# Patient Record
Sex: FEMALE | Race: BLACK OR AFRICAN AMERICAN | Employment: UNEMPLOYED | URBAN - METROPOLITAN AREA
[De-identification: names, ages, dates, MRNs, and addresses within clinical notes are randomized per-mention and may not be internally consistent; named-entity substitution may affect disease eponyms.]

---

## 2021-03-23 ENCOUNTER — OFFICE VISIT (OUTPATIENT)
Dept: INTERNAL MEDICINE CLINIC | Age: 36
End: 2021-03-23
Payer: COMMERCIAL

## 2021-03-23 VITALS
WEIGHT: 285.6 LBS | HEIGHT: 66 IN | OXYGEN SATURATION: 99 % | HEART RATE: 98 BPM | SYSTOLIC BLOOD PRESSURE: 120 MMHG | BODY MASS INDEX: 45.9 KG/M2 | DIASTOLIC BLOOD PRESSURE: 80 MMHG | TEMPERATURE: 97.2 F

## 2021-03-23 DIAGNOSIS — I10 ESSENTIAL HYPERTENSION: ICD-10-CM

## 2021-03-23 DIAGNOSIS — E28.2 POLYCYSTIC OVARIAN SYNDROME: ICD-10-CM

## 2021-03-23 DIAGNOSIS — E11.9 TYPE 2 DIABETES MELLITUS WITHOUT COMPLICATION, WITHOUT LONG-TERM CURRENT USE OF INSULIN (HCC): Primary | ICD-10-CM

## 2021-03-23 DIAGNOSIS — E66.01 MORBID OBESITY (HCC): ICD-10-CM

## 2021-03-23 DIAGNOSIS — Z34.90 PREGNANCY, UNSPECIFIED GESTATIONAL AGE: ICD-10-CM

## 2021-03-23 DIAGNOSIS — Z13.9 SCREENING FOR CONDITION: ICD-10-CM

## 2021-03-23 DIAGNOSIS — D50.8 OTHER IRON DEFICIENCY ANEMIA: ICD-10-CM

## 2021-03-23 LAB
BILIRUBIN, POC: NORMAL
BLOOD URINE, POC: 1.02
CHP ED QC CHECK: NORMAL
CLARITY, POC: NORMAL
COLOR, POC: YELLOW
CONTROL: NORMAL
GLUCOSE BLD-MCNC: 115 MG/DL
GLUCOSE URINE, POC: NORMAL
HBA1C MFR BLD: 6.2 %
KETONES, POC: NORMAL
LEUKOCYTE EST, POC: NORMAL
NITRITE, POC: NORMAL
PH, POC: 8
PREGNANCY TEST URINE, POC: POSITIVE
PROTEIN, POC: NORMAL
SPECIFIC GRAVITY, POC: NORMAL
UROBILINOGEN, POC: 8

## 2021-03-23 PROCEDURE — 83036 HEMOGLOBIN GLYCOSYLATED A1C: CPT | Performed by: INTERNAL MEDICINE

## 2021-03-23 PROCEDURE — 99204 OFFICE O/P NEW MOD 45 MIN: CPT | Performed by: INTERNAL MEDICINE

## 2021-03-23 PROCEDURE — 81025 URINE PREGNANCY TEST: CPT | Performed by: INTERNAL MEDICINE

## 2021-03-23 PROCEDURE — 81002 URINALYSIS NONAUTO W/O SCOPE: CPT | Performed by: INTERNAL MEDICINE

## 2021-03-23 PROCEDURE — 82962 GLUCOSE BLOOD TEST: CPT | Performed by: INTERNAL MEDICINE

## 2021-03-23 RX ORDER — CHLORTHALIDONE 25 MG/1
25 TABLET ORAL DAILY
COMMUNITY
End: 2021-10-05

## 2021-03-23 SDOH — ECONOMIC STABILITY: TRANSPORTATION INSECURITY
IN THE PAST 12 MONTHS, HAS LACK OF TRANSPORTATION KEPT YOU FROM MEETINGS, WORK, OR FROM GETTING THINGS NEEDED FOR DAILY LIVING?: NO

## 2021-03-23 SDOH — ECONOMIC STABILITY: TRANSPORTATION INSECURITY
IN THE PAST 12 MONTHS, HAS THE LACK OF TRANSPORTATION KEPT YOU FROM MEDICAL APPOINTMENTS OR FROM GETTING MEDICATIONS?: NO

## 2021-03-23 SDOH — ECONOMIC STABILITY: FOOD INSECURITY: WITHIN THE PAST 12 MONTHS, THE FOOD YOU BOUGHT JUST DIDN'T LAST AND YOU DIDN'T HAVE MONEY TO GET MORE.: NEVER TRUE

## 2021-03-23 ASSESSMENT — PATIENT HEALTH QUESTIONNAIRE - PHQ9
SUM OF ALL RESPONSES TO PHQ QUESTIONS 1-9: 0
SUM OF ALL RESPONSES TO PHQ QUESTIONS 1-9: 0
SUM OF ALL RESPONSES TO PHQ9 QUESTIONS 1 & 2: 0

## 2021-03-23 NOTE — PROGRESS NOTES
SUBJECTIVE:  Malgorzata Fu is a 38 y.o. female [address] [place]   Chief Complaint   Patient presents with    New Patient    Hypertension    Diabetes      PT HERE TO INITIATE CARE     PREVIOUS PCP: DR BALLESTEROS - JEREL      DM - DIAGNOSED > 10 YRS. TAKING  MED.  ? EXERCISE / DIET  COMPLIANCE . HBS - HAS NOT BEEN CHECKING  . LAST EYE EXAM - ? 2020  HTN - ON MED. ELEVATED BP. ? DIET / EXERCISE COMPLIANCE, NO HEADACHE, NO DIZZINESS  POLYCYSTIC OVARY SYNDROME  - FOLLOWING WITH GYNE  IRON DEF ANEMIA - ON MED. OCC FATIGUE. NO COLD INTOLERANCE  MORBID OBESITY - DIET / EXERCISE REVIEWED. WT NOTED  SCREENING LAB D/W PT    DENIES CP, No SOB, No PALPITATIONS, No COUGH, NO F/C  No ABD PAIN, No N/V, No DIARRHEA, No CONSTIPATION, No MELENA, No HEMATOCHEZIA. No DYSURIA, No FREQ, No URGENCY, No HEMATURIA    PMH: REVIEWED AND UPDATED TODAY    PSH: REVIEWED AND UPDATED TODAY    SOCIAL HX: REVIEWED AND UPDATED TODAY    FAMILY HX: REVIEWED AND UPDATED TODAY    ALLERGY:  Patient has no known allergies. MEDS: REVIEWED  Prior to Visit Medications    Medication Sig Taking?  Authorizing Provider   metFORMIN (GLUCOPHAGE) 1000 MG tablet Take 1,000 mg by mouth 2 times daily (with meals) Yes Historical Provider, MD   chlorthalidone (HYGROTON) 25 MG tablet Take 25 mg by mouth daily Yes Historical Provider, MD   Polysaccharide Iron Complex (IRON UP PO) Take by mouth Yes Historical Provider, MD       OB/GYN: LMP 2/21                  LAST PAP SMEAR 12/20                  MAMMOGRAM ? 2019    IMMUNIZATION: INFLUENZA 9 / 20, TETANUS ? DATE    ROS: COMPREHENSIVE ROS AS IN HX, REST -VE  History obtained from chart review and the patient    OBJECTIVE:   NURSING NOTE AND VITALS REVIEWED  BP (!) 142/90 (Site: Left Upper Arm, Position: Sitting, Cuff Size: Large Adult)   Pulse 105   Temp 97.2 °F (36.2 °C)   Ht 5' 6.25\" (1.683 m)   Wt 285 lb 9.6 oz (129.5 kg)   SpO2 99%   Breastfeeding No   BMI 45.75 kg/m²     NO ACUTE DISTRESS    REPEAT BP:  120/80 (LT), NO ORTHOSTASIS     REPEAT PULSE: 98 - MANUAL    Body mass index is 45.75 kg/m². HEENT: MILD PALLOR, ANICTERIC, PERRLA, EOMI, NO CONJUNCTIVAL ERYTHEMA,                 NO SINUS TENDERNESS  NECK:  SUPPLE, TRACHEA MIDLINE, NT, NO JVD, NO CB, NO LA, NO TM, NO STIFFNESS  CHEST: RESPY EFFORT NL, GOOD AE, NO W/R/C  HEART: S1S2+ REG, NO M/G/R  ABD: OBESE, SOFT, NT, NO HSM, BS+  EXT: NO EDEMA, NT, PULSES +. LASHAY'S -VE  NEURO: ALERT AND ORIENTED X 3, NO MENINGEAL SIGNS, NO TREMORS, NL GAIT, NO FOCAL DEFICITS  PSYCH: FAIRLY GOOD AFFECT  BACK: NT, NO ROM, NO CVA TENDERNESS    PREVIOUS OUTSIDE LABS  REVIEWED AND D/W PT     ACCUCHECK: 115    POC HBA1C: 6.2    UA: NEGATIVE FOR UTI    PREG TEST : POSITIVE      ASSESSMENT / PLAN:     Diagnosis Orders   1. Type 2 diabetes mellitus without complication, without long-term current use of insulin ([facility] [address]  COUNSELLED. STABLE  READDRESS MED WITH OB  DIET/ EXERCISE. MONITOR. GOAL FBS 80- 120, HBA1C < 7  ADVISED ON HOME BLOOD SUGAR MONITORING  OBTAIN OPHTHAL REPORT  MAKE CHANGES AS NEEDED. 2. Essential hypertension  COUNSELLED. REPEAT BP AS ABOVE  READDRESS MED WITH OB  LOW NA+ / DASH DIET/ EXERCISE. MONITOR. GOAL </= 120/80  MAKE CHANGES AS NEEDED. 3. Polycystic ovarian syndrome  COUNSELLED. F/U OB/GYN. MONITOR  MAKE CHANGES AS NEEDED. 4. Other iron deficiency anemia  COUNSELLED. DEFERRED LAB TO PB. MONITOR  MAKE CHANGES AS NEEDED. 5. Morbid obesity ([address]  COUNSELLED. DIET/ EXERCISE.  LIFESTYLE MODIFICATION. MONITOR AND MAKE CHANGES AS NEEDED. 6. Screening for condition  COUNSELLED. LABS D/W PT. SEE BELOW       7. Pregnancy, unspecified gestational age  COUNSELLED. POSITIVE PREGNANCY TEST DISCUSSED WITH PT  PT PLEASANTLY SURPRISED  ADVISED HEALTHY HABITS  REFER OB FOR CONTINUED CARE   MAKE CHANGES AS NEEDED.                          MEDICATION SIDE EFFECTS D/W PATIENT    PT STABLE AT TIME OF D/C.    RETURN TO CLINIC POSTPARTUM / PRN    FOLLOW UP WITH OB

## 2021-10-01 ENCOUNTER — TELEPHONE (OUTPATIENT)
Dept: INTERNAL MEDICINE CLINIC | Age: 36
End: 2021-10-01

## 2021-10-01 NOTE — TELEPHONE ENCOUNTER
----- Message from Lisa Taylor sent at 9/29/2021  2:07 PM EDT -----  Subject: Appointment Request    Reason for Call: Routine Existing Condition Follow Up (Diabetes)    QUESTIONS  Type of Appointment? Established Patient  Reason for appointment request? No appointments available during search  Additional Information for Provider? Pt would like a diabetic management   along with high BP. Pt would like an apptmt on Tuesday if possible along   with checking iron levels. Pt would like to discuss possible birth control   as well. [place] On BP medications 3 x's daily Labetalol 300mg   (hospitalist possible due to preeclampsia)   ---------------------------------------------------------------------------  --------------  CALL BACK INFO  What is the best way for the office to contact you? OK to leave message on   voicemail  Preferred Call Back Phone Number? [phone]  ---------------------------------------------------------------------------  --------------  SCRIPT ANSWERS  Relationship to Patient? Self  (Is the patient requesting to be seen urgently for their symptoms?)? No  Is this follow up request related to routine Diabetes Management? Yes  Are you having any new concerns about your existing condition? No  Have you been diagnosed with, awaiting test results for, or told that you   are suspected of having COVID-19 (Coronavirus)? (If patient has tested   negative or was tested as a requirement for work, school, or travel and   not based on symptoms, answer no)? No  Within the past two weeks have you developed any of the following symptoms   (answer no if symptoms have been present longer than 2 weeks or began   more than 2 weeks ago)? Fever or Chills, Cough, Shortness of breath or   difficulty breathing, Loss of taste or smell, Sore throat, Nasal   congestion, Sneezing or runny nose, Fatigue or generalized body aches   (answer no if pain is specific to a body part e.g. back pain), Diarrhea,   Headache?

## 2021-10-05 ENCOUNTER — OFFICE VISIT (OUTPATIENT)
Dept: INTERNAL MEDICINE CLINIC | Age: 36
End: 2021-10-05
Payer: COMMERCIAL

## 2021-10-05 VITALS
HEIGHT: 66 IN | DIASTOLIC BLOOD PRESSURE: 84 MMHG | SYSTOLIC BLOOD PRESSURE: 150 MMHG | WEIGHT: 293 LBS | BODY MASS INDEX: 47.09 KG/M2 | OXYGEN SATURATION: 99 % | HEART RATE: 98 BPM | TEMPERATURE: 96.9 F

## 2021-10-05 DIAGNOSIS — E11.9 TYPE 2 DIABETES MELLITUS WITHOUT COMPLICATION, WITHOUT LONG-TERM CURRENT USE OF INSULIN (HCC): ICD-10-CM

## 2021-10-05 DIAGNOSIS — I10 ESSENTIAL HYPERTENSION: Primary | ICD-10-CM

## 2021-10-05 LAB
CHP ED QC CHECK: NORMAL
GLUCOSE BLD-MCNC: 134 MG/DL
HBA1C MFR BLD: 5.4 %

## 2021-10-05 PROCEDURE — 99214 OFFICE O/P EST MOD 30 MIN: CPT | Performed by: NURSE PRACTITIONER

## 2021-10-05 PROCEDURE — 82962 GLUCOSE BLOOD TEST: CPT | Performed by: NURSE PRACTITIONER

## 2021-10-05 PROCEDURE — 83036 HEMOGLOBIN GLYCOSYLATED A1C: CPT | Performed by: NURSE PRACTITIONER

## 2021-10-05 RX ORDER — LOSARTAN POTASSIUM 50 MG/1
50 TABLET ORAL DAILY
Qty: 30 TABLET | Refills: 2 | Status: SHIPPED | OUTPATIENT
Start: 2021-10-05 | End: 2021-10-12 | Stop reason: SDUPTHER

## 2021-10-05 RX ORDER — LABETALOL 300 MG/1
300 TABLET, FILM COATED ORAL 3 TIMES DAILY
COMMUNITY
End: 2021-10-05

## 2021-10-05 ASSESSMENT — ENCOUNTER SYMPTOMS
SHORTNESS OF BREATH: 0
GASTROINTESTINAL NEGATIVE: 1

## 2021-10-05 ASSESSMENT — PATIENT HEALTH QUESTIONNAIRE - PHQ9
SUM OF ALL RESPONSES TO PHQ QUESTIONS 1-9: 0
2. FEELING DOWN, DEPRESSED OR HOPELESS: 0
SUM OF ALL RESPONSES TO PHQ QUESTIONS 1-9: 0
SUM OF ALL RESPONSES TO PHQ9 QUESTIONS 1 & 2: 0
SUM OF ALL RESPONSES TO PHQ QUESTIONS 1-9: 0
1. LITTLE INTEREST OR PLEASURE IN DOING THINGS: 0

## 2021-10-05 NOTE — PROGRESS NOTES
Patient: Rico Moon is a 39 y.o. female who presents today with the following Chief Complaint(s):  Chief Complaint   Patient presents with    Hypertension     pt would like to be put on birthcontrol gyn stated she needs to come here due to bp     Diabetes       HPI:  Presents to the office c/o elevated BP. Delivered her baby 3 months premature in August. Was started on labetalol upon discharge from the hospital. Says her BP has been running in the 140-150's at home. She wants to be started on contraceptive pills once BP is managed. Hypertension  This is a chronic problem. The current episode started more than 1 month ago. The problem is unchanged. The problem is uncontrolled. Associated symptoms include anxiety and headaches. Pertinent negatives include no chest pain, palpitations, peripheral edema or shortness of breath. There are no associated agents to hypertension. Risk factors for coronary artery disease include obesity and stress. Past treatments include beta blockers and diuretics. The current treatment provides no improvement. Compliance problems include exercise and diet ( is in NICU so she hasn't been able to take care of herself much). Diabetes  Managed with Metformin. A1c 5.4 today. Current Outpatient Medications   Medication Sig Dispense Refill    losartan (COZAAR) 50 MG tablet Take 1 tablet by mouth daily 30 tablet 2    metFORMIN (GLUCOPHAGE) 1000 MG tablet Take 1,000 mg by mouth 2 times daily (with meals)      Polysaccharide Iron Complex (IRON UP PO) Take by mouth       No current facility-administered medications for this visit. Patient's past medical history, surgical history, family history, medications,  and allergies  were all reviewed and updated as appropriate today.     Patient Active Problem List   Diagnosis    Essential hypertension     Past Medical History:   Diagnosis Date    Diabetes mellitus ([facility] [address]     Hypertension     Iron deficiency anemia  Polycystic ovary syndrome       Past Surgical History:   Procedure Laterality Date    SHOULDER SURGERY Right        Family History   Problem Relation Age of Onset    High Blood Pressure Mother     Diabetes Mother     High Blood Pressure Father     Colon Cancer Maternal Grandmother       No Known Allergies      Review of Systems   HENT: Negative. Respiratory: Negative for shortness of breath. Cardiovascular: Negative for chest pain and palpitations. Gastrointestinal: Negative. Had    Endocrine:        Diabetes-A1c today is 5.4   Genitourinary: Negative. Musculoskeletal: Negative. Skin: Negative. Neurological: Positive for headaches. Psychiatric/Behavioral: The patient is nervous/anxious. Vitals:    10/05/21 0924 10/05/21 0935   BP: (!) 158/86 (!) 150/84   Site: Left Upper Arm    Position: Sitting    Cuff Size: Large Adult    Pulse: 98    Temp: 96.9 °F (36.1 °C)    SpO2: 99%    Weight: 294 lb (133.4 kg)    Height: 5' 6.25\" (1.683 m)      Physical Exam  Constitutional:       General: She is not in acute distress. Appearance: Normal appearance. She is obese. She is not ill-appearing, toxic-appearing or diaphoretic. HENT:      Head: Normocephalic. Neck:      Vascular: No carotid bruit. Cardiovascular:      Rate and Rhythm: Normal rate and regular rhythm. Pulses: Normal pulses. Heart sounds: Normal heart sounds. Pulmonary:      Effort: Pulmonary effort is normal.      Breath sounds: Normal breath sounds. Abdominal:      General: Bowel sounds are normal. There is no distension. Palpations: Abdomen is soft. There is no mass. Tenderness: There is abdominal tenderness (at  incision). There is no guarding. Hernia: No hernia is present. Musculoskeletal:         General: Normal range of motion. Cervical back: Normal range of motion and neck supple. No rigidity or tenderness. Right lower leg: No edema.       Left lower leg: No edema. Lymphadenopathy:      Cervical: No cervical adenopathy. Skin:     General: Skin is warm and dry. Neurological:      Mental Status: She is alert and oriented to person, place, and time. Psychiatric:         Mood and Affect: Mood normal.         Behavior: Behavior normal.         Thought Content: Thought content normal.         Judgment: Judgment normal.            Assessment/Plan:  1. Essential hypertension  - losartan (COZAAR) 50 MG tablet; Take 1 tablet by mouth daily  Dispense: 30 tablet; Refill: 2  - Low sodium diet. Exercise. - Stop labetalol. 2. Type 2 diabetes mellitus without complication, without long-term current use of insulin (HCC)  - POCT Glucose-134  - POCT glycosylated hemoglobin (Hb A1C)-5.4  - Continue Metformin.  - Low carb diet. Exercise. Return in about 1 week (around 10/12/2021) for Nurse visit/BP check.

## 2021-10-05 NOTE — PATIENT INSTRUCTIONS
Patient Education        DASH Diet: Care Instructions  Your Care Instructions     The DASH diet is an eating plan that can help lower your blood pressure. DASH stands for Dietary Approaches to Stop Hypertension. Hypertension is high blood pressure. The DASH diet focuses on eating foods that are high in calcium, potassium, and magnesium. These nutrients can lower blood pressure. The foods that are highest in these nutrients are fruits, vegetables, low-fat dairy products, nuts, seeds, and legumes. But taking calcium, potassium, and magnesium supplements instead of eating foods that are high in those nutrients does not have the same effect. The DASH diet also includes whole grains, fish, and poultry. The DASH diet is one of several lifestyle changes your doctor may recommend to lower your high blood pressure. Your doctor may also want you to decrease the amount of sodium in your diet. Lowering sodium while following the DASH diet can lower blood pressure even further than just the DASH diet alone. Follow-up care is a key part of your treatment and safety. Be sure to make and go to all appointments, and call your doctor if you are having problems. It's also a good idea to know your test results and keep a list of the medicines you take. How can you care for yourself at home? Following the DASH diet  · Eat 4 to 5 servings of fruit each day. A serving is 1 medium-sized piece of fruit, ½ cup chopped or canned fruit, 1/4 cup dried fruit, or 4 ounces (½ cup) of fruit juice. Choose fruit more often than fruit juice. · Eat 4 to 5 servings of vegetables each day. A serving is 1 cup of lettuce or raw leafy vegetables, ½ cup of chopped or cooked vegetables, or 4 ounces (½ cup) of vegetable juice. Choose vegetables more often than vegetable juice. · Get 2 to 3 servings of low-fat and fat-free dairy each day. A serving is 8 ounces of milk, 1 cup of yogurt, or 1 ½ ounces of cheese. · Eat 6 to 8 servings of grains each day. a low-fat dip as an appetizer instead of chips and dip. ? Sprinkle sunflower seeds or chopped almonds over salads. Or try adding chopped walnuts or almonds to cooked vegetables. ? Try some vegetarian meals using beans and peas. Add garbanzo or kidney beans to salads. Make burritos and tacos with mashed hickman beans or black beans. Where can you learn more? Go to [email or web address]. org and sign in to your [employer] account. Enter L468 in the Tactile box to learn more about \"DASH Diet: Care Instructions. \"     If you do not have an account, please click on the \"Sign Up Now\" link. Current as of: April 29, 2021               Content Version: 13.0  © 1710-2675 Healthwise, Incorporated. Care instructions adapted under license by [facility] ([facility]). If you have questions about a medical condition or this instruction, always ask your healthcare professional. [address] any warranty or liability for your use of this information.

## 2021-10-12 ENCOUNTER — NURSE ONLY (OUTPATIENT)
Dept: INTERNAL MEDICINE CLINIC | Age: 36
End: 2021-10-12

## 2021-10-12 DIAGNOSIS — I10 ESSENTIAL HYPERTENSION: ICD-10-CM

## 2021-10-12 RX ORDER — LOSARTAN POTASSIUM 100 MG/1
100 TABLET ORAL DAILY
Qty: 30 TABLET | Refills: 0 | Status: SHIPPED | OUTPATIENT
Start: 2021-10-12 | End: 2021-10-21 | Stop reason: ALTCHOICE

## 2021-10-12 NOTE — PROGRESS NOTES
Pt is being seen today for BP reading and she stated that she had a child 8 weeks ago. She is present with a headache since this morning. Pt is on losartan 50 mg she did take medication today. BP reading 140/99 right arm. Pt is anxious due to having to return to work. Pt is not ready to go back. Second reading 140/90.

## 2021-10-21 ENCOUNTER — OFFICE VISIT (OUTPATIENT)
Dept: INTERNAL MEDICINE CLINIC | Age: 36
End: 2021-10-21
Payer: COMMERCIAL

## 2021-10-21 VITALS
SYSTOLIC BLOOD PRESSURE: 160 MMHG | WEIGHT: 293 LBS | BODY MASS INDEX: 47.09 KG/M2 | TEMPERATURE: 97 F | HEART RATE: 97 BPM | OXYGEN SATURATION: 99 % | DIASTOLIC BLOOD PRESSURE: 90 MMHG | HEIGHT: 66 IN

## 2021-10-21 DIAGNOSIS — E11.9 TYPE 2 DIABETES MELLITUS WITHOUT COMPLICATION, WITHOUT LONG-TERM CURRENT USE OF INSULIN (HCC): Primary | ICD-10-CM

## 2021-10-21 DIAGNOSIS — E66.01 MORBID OBESITY (HCC): ICD-10-CM

## 2021-10-21 DIAGNOSIS — D50.8 OTHER IRON DEFICIENCY ANEMIA: ICD-10-CM

## 2021-10-21 DIAGNOSIS — I10 HYPERTENSION, UNCONTROLLED: ICD-10-CM

## 2021-10-21 DIAGNOSIS — R00.2 PALPITATIONS: ICD-10-CM

## 2021-10-21 DIAGNOSIS — Z13.9 SCREENING FOR CONDITION: ICD-10-CM

## 2021-10-21 DIAGNOSIS — E28.2 POLYCYSTIC OVARIAN SYNDROME: ICD-10-CM

## 2021-10-21 DIAGNOSIS — R53.83 OTHER FATIGUE: ICD-10-CM

## 2021-10-21 LAB
CHP ED QC CHECK: NORMAL
GLUCOSE BLD-MCNC: 184 MG/DL

## 2021-10-21 PROCEDURE — 82962 GLUCOSE BLOOD TEST: CPT | Performed by: INTERNAL MEDICINE

## 2021-10-21 PROCEDURE — 93000 ELECTROCARDIOGRAM COMPLETE: CPT | Performed by: INTERNAL MEDICINE

## 2021-10-21 PROCEDURE — 99214 OFFICE O/P EST MOD 30 MIN: CPT | Performed by: INTERNAL MEDICINE

## 2021-10-21 RX ORDER — AMLODIPINE BESYLATE 10 MG/1
10 TABLET ORAL DAILY
Qty: 30 TABLET | Refills: 3 | Status: SHIPPED | OUTPATIENT
Start: 2021-10-21 | End: 2021-11-16 | Stop reason: SDUPTHER

## 2021-10-21 NOTE — PROGRESS NOTES
SUBJECTIVE:  Key Altamirano is a 39 y.o. female HERE FOR   Chief Complaint   Patient presents with    Diabetes    Hypertension    Other        PT HERE FOR EVAL    DM - TAKING  METFORMIN. ? EXERCISE / DIET  COMPLIANCE . HBS - 60'S TO 'S  . LAST EYE EXAM - ? 2020  HTN - ON COZAAR. ELEVATED BP. ? DIET / EXERCISE COMPLIANCE, + HEADACHE, NO DIZZINESS  C/O PALPITATIONS - ? DURATION. NO SYNCOPE. ? CAFFEINE USE  IRON DEF ANEMIA - ON MED. + FATIGUE. NO COLD INTOLERANCE. PT FOLLOWING WITH HEMATOLOGY  C/O FATIGUE - INCREASED. ? DURATION. NO COLD / ? HEAT INTOLERANCE  POLYCYSTIC OVARY SYNDROME  - FOLLOWING WITH GYNE. PT POSTPARTUM  MORBID OBESITY - DIET / EXERCISE REVIEWED. WT NOTED  SCREENING LAB D/W PT     DENIES CP, No SOB, + PALPITATIONS, No COUGH, NO F/C  No ABD PAIN, No N/V, No DIARRHEA, No CONSTIPATION, No MELENA, No HEMATOCHEZIA. No DYSURIA, No FREQ, No URGENCY, No HEMATURIA      PMH: REVIEWED AND UPDATED TODAY    PSH: REVIEWED AND UPDATED TODAY    SOCIAL HX: REVIEWED AND UPDATED TODAY    FAMILY HX: REVIEWED AND UPDATED TODAY    ALLERGY:  Patient has no known allergies. MEDS: REVIEWED  Prior to Visit Medications    Medication Sig Taking? Authorizing Provider   losartan (COZAAR) 100 MG tablet Take 1 tablet by mouth daily Yes NURY Branch   metFORMIN (GLUCOPHAGE) 1000 MG tablet Take 1,000 mg by mouth 2 times daily (with meals) Yes Historical Provider, MD   Polysaccharide Iron Complex (IRON UP PO) Take by mouth Yes Historical Provider, MD     ROS: COMPREHENSIVE ROS AS IN HX, REST -VE  History obtained from chart review and the patient       OBJECTIVE:   NURSING NOTE AND VITALS REVIEWED  BP (!) 140/84   Pulse 97   Temp 97 °F (36.1 °C)   Ht 5' 6\" (1.676 m)   Wt 296 lb (134.3 kg)   SpO2 99%   Breastfeeding No   BMI 47.78 kg/m²     NO ACUTE DISTRESS    REPEAT BP: 160/90 (BRAY[place]), NO ORTHOSTASIS     Body mass index is 47.78 kg/m².      HEENT: NO PALLOR, ANICTERIC, PERRLA, EOMI, NO CONJUNCTIVAL ERYTHEMA, NO SINUS TENDERNESS  NECK:  SUPPLE, TRACHEA MIDLINE, NT, NO JVD, NO CB, NO LA, NO TM, NO STIFFNESS  CHEST: RESPY EFFORT NL, GOOD AE, NO W/R/C  HEART: S1S2+ REG, NO M/G/R  ABD: OBESE, SOFT, NT, NO HSM, BS+  EXT: NO EDEMA, NT, PULSES +. LASHAY'S -VE  NEURO: ALERT AND ORIENTED X 3, NO MENINGEAL SIGNS, NO TREMORS, NL GAIT, NO FOCAL DEFICITS  PSYCH: FAIRLY GOOD AFFECT  BACK: NT, NO ROM, NO CVA TENDERNESS     PREVIOUS LABS / OUTSIDE LABS REVIEWED AND D/W PT    ACCUCHECK: 184 - POSTPRANDIAL    LAST HBA1C: 5.4      EKG: SINUS RHYTHM HR 89, NO ACUTE FINDINGS      ASSESSMENT / PLAN:     Diagnosis Orders   1. Type 2 diabetes mellitus without complication, without long-term current use of insulin ([facility] [address]  COUNSELLED. HYPERGLYCEMIA NOTED  DEFERRED MED CHANGE  MED COMPLIANCE ADVISED  DIET/ EXERCISE ADVISED. MONITOR. GOAL FBS 80- 120, HBA1C < 7  ADVISED ON HOME BLOOD SUGAR MONITORING  F/U DM EYE EXAM.  F/U LABS  MAKE CHANGES AS NEEDED. 2. Hypertension, uncontrolled  COUNSELLED. CHANGE MED TO NORVASC 10 MG DAILY  D/MARCELINO COZAAR DUE TO S/E - IN VIEW OF LIKELIHOOD OF FUTURE PREGNANCY   ADVISED LOW NA+ / DASH DIET/ EXERCISE. MONITOR. GOAL </= 120/80  F/U LABS  MAKE CHANGES AS NEEDED. 3. Palpitations  COUNSELLED. EKG UNREMARKABLE  AVOID CAFFEINE. TSH WITH LABS. ADVISED ON STRESS MGT. MONITOR. MAKE CHANGES AS NEEDED. 4. Other iron deficiency anemia  COUNSELLED. MONITOR LAB  F/U HEMATOLOGY  MAKE CHANGES AS NEEDED. 5. Other fatigue  COUNSELLED. LABS TO EVAL. EVAL ANEMIA,  R/O THYROID D/O.  LIFESTYLE MODIFICATION. MAKE CHANGES AS NEEDED. 6. Polycystic ovarian syndrome  COUNSELLED. F/U GYNE  MAKE CHANGES AS NEEDED. 7. Morbid obesity ([facility] [address]  COUNSELLED. DIET/ EXERCISE ADVISED. LIFESTYLE MODIFICATION. WT LOSS ADVISED. MONITOR AND MAKE CHANGES AS NEEDED. 8. Screening for condition  COUNSELLED. LAB TO EVAL - OK WITH PT  MAKE CHANGES AS NEEDED.                          MEDICATION SIDE EFFECTS D/W PATIENT    RETURN TO CLINIC WITHIN 4-6 WEEKS / PRN    FOLLOW UP FOR FASTING LABS, EYE EXAM

## 2021-10-21 NOTE — PATIENT INSTRUCTIONS
TAKE MED AS ADVISED    DIET/ EXERCISE.     FOLLOW UP WITHIN 4-6 WEEKS / AS NEEDED    FOLLOW UP FOR FASTING LABS, EYE EXAM

## 2021-11-10 DIAGNOSIS — E11.9 TYPE 2 DIABETES MELLITUS WITHOUT COMPLICATION, WITHOUT LONG-TERM CURRENT USE OF INSULIN (HCC): ICD-10-CM

## 2021-11-10 DIAGNOSIS — R53.83 OTHER FATIGUE: ICD-10-CM

## 2021-11-10 DIAGNOSIS — I10 HYPERTENSION, UNCONTROLLED: ICD-10-CM

## 2021-11-10 DIAGNOSIS — Z13.9 SCREENING FOR CONDITION: ICD-10-CM

## 2021-11-10 DIAGNOSIS — D50.8 OTHER IRON DEFICIENCY ANEMIA: ICD-10-CM

## 2021-11-10 DIAGNOSIS — R00.2 PALPITATIONS: ICD-10-CM

## 2021-11-10 LAB
A/G RATIO: 1.2 (ref 1.1–2.2)
ALBUMIN SERPL-MCNC: 4.3 G/DL (ref 3.4–5)
ALP BLD-CCNC: 85 U/L (ref 40–129)
ALT SERPL-CCNC: 8 U/L (ref 10–40)
ANION GAP SERPL CALCULATED.3IONS-SCNC: 12 MMOL/L (ref 3–16)
AST SERPL-CCNC: 11 U/L (ref 15–37)
BASOPHILS ABSOLUTE: 0 K/UL (ref 0–0.2)
BASOPHILS RELATIVE PERCENT: 1 %
BILIRUB SERPL-MCNC: 0.5 MG/DL (ref 0–1)
BUN BLDV-MCNC: 10 MG/DL (ref 7–20)
CALCIUM SERPL-MCNC: 9.1 MG/DL (ref 8.3–10.6)
CHLORIDE BLD-SCNC: 104 MMOL/L (ref 99–110)
CHOLESTEROL, TOTAL: 147 MG/DL (ref 0–199)
CO2: 24 MMOL/L (ref 21–32)
CREAT SERPL-MCNC: 0.7 MG/DL (ref 0.6–1.1)
CREATININE URINE: 244.7 MG/DL (ref 28–259)
EOSINOPHILS ABSOLUTE: 0.1 K/UL (ref 0–0.6)
EOSINOPHILS RELATIVE PERCENT: 2.8 %
FOLATE: 11.71 NG/ML (ref 4.78–24.2)
GFR AFRICAN AMERICAN: >60
GFR NON-AFRICAN AMERICAN: >60
GLUCOSE BLD-MCNC: 141 MG/DL (ref 70–99)
HCT VFR BLD CALC: 33.3 % (ref 36–48)
HDLC SERPL-MCNC: 62 MG/DL (ref 40–60)
HEMOGLOBIN: 10.5 G/DL (ref 12–16)
LDL CHOLESTEROL CALCULATED: 75 MG/DL
LYMPHOCYTES ABSOLUTE: 1.3 K/UL (ref 1–5.1)
LYMPHOCYTES RELATIVE PERCENT: 34.4 %
MCH RBC QN AUTO: 24.8 PG (ref 26–34)
MCHC RBC AUTO-ENTMCNC: 31.5 G/DL (ref 31–36)
MCV RBC AUTO: 79 FL (ref 80–100)
MICROALBUMIN UR-MCNC: <1.2 MG/DL
MICROALBUMIN/CREAT UR-RTO: NORMAL MG/G (ref 0–30)
MONOCYTES ABSOLUTE: 0.3 K/UL (ref 0–1.3)
MONOCYTES RELATIVE PERCENT: 6.4 %
NEUTROPHILS ABSOLUTE: 2.2 K/UL (ref 1.7–7.7)
NEUTROPHILS RELATIVE PERCENT: 55.4 %
PDW BLD-RTO: 16.8 % (ref 12.4–15.4)
PLATELET # BLD: 288 K/UL (ref 135–450)
PMV BLD AUTO: 9.4 FL (ref 5–10.5)
POTASSIUM SERPL-SCNC: 4.1 MMOL/L (ref 3.5–5.1)
RBC # BLD: 4.21 M/UL (ref 4–5.2)
SODIUM BLD-SCNC: 140 MMOL/L (ref 136–145)
TOTAL PROTEIN: 8 G/DL (ref 6.4–8.2)
TRIGL SERPL-MCNC: 48 MG/DL (ref 0–150)
TSH REFLEX: 1.55 UIU/ML (ref 0.27–4.2)
VITAMIN B-12: 336 PG/ML (ref 211–911)
VITAMIN D 25-HYDROXY: 9.6 NG/ML
VLDLC SERPL CALC-MCNC: 10 MG/DL
WBC # BLD: 3.9 K/UL (ref 4–11)

## 2021-11-11 DIAGNOSIS — D50.9 MICROCYTIC ANEMIA: Primary | ICD-10-CM

## 2021-11-16 ENCOUNTER — OFFICE VISIT (OUTPATIENT)
Dept: INTERNAL MEDICINE CLINIC | Age: 36
End: 2021-11-16
Payer: COMMERCIAL

## 2021-11-16 VITALS
HEART RATE: 84 BPM | BODY MASS INDEX: 47.09 KG/M2 | TEMPERATURE: 96.6 F | DIASTOLIC BLOOD PRESSURE: 80 MMHG | OXYGEN SATURATION: 99 % | WEIGHT: 293 LBS | HEIGHT: 66 IN | SYSTOLIC BLOOD PRESSURE: 130 MMHG

## 2021-11-16 DIAGNOSIS — E11.59 HYPERTENSION ASSOCIATED WITH DIABETES (HCC): Primary | ICD-10-CM

## 2021-11-16 DIAGNOSIS — D50.8 OTHER IRON DEFICIENCY ANEMIA: ICD-10-CM

## 2021-11-16 DIAGNOSIS — E55.9 VITAMIN D DEFICIENCY: ICD-10-CM

## 2021-11-16 DIAGNOSIS — E28.2 POLYCYSTIC OVARIAN SYNDROME: ICD-10-CM

## 2021-11-16 DIAGNOSIS — E66.01 MORBID OBESITY (HCC): ICD-10-CM

## 2021-11-16 DIAGNOSIS — I15.2 HYPERTENSION ASSOCIATED WITH DIABETES (HCC): Primary | ICD-10-CM

## 2021-11-16 DIAGNOSIS — E11.9 TYPE 2 DIABETES MELLITUS WITHOUT COMPLICATION, WITHOUT LONG-TERM CURRENT USE OF INSULIN (HCC): ICD-10-CM

## 2021-11-16 LAB
CHP ED QC CHECK: NORMAL
GLUCOSE BLD-MCNC: 116 MG/DL

## 2021-11-16 PROCEDURE — 82962 GLUCOSE BLOOD TEST: CPT | Performed by: INTERNAL MEDICINE

## 2021-11-16 PROCEDURE — 99214 OFFICE O/P EST MOD 30 MIN: CPT | Performed by: INTERNAL MEDICINE

## 2021-11-16 RX ORDER — ERGOCALCIFEROL 1.25 MG/1
50000 CAPSULE ORAL WEEKLY
Qty: 12 CAPSULE | Refills: 0 | Status: SHIPPED | OUTPATIENT
Start: 2021-11-16 | End: 2022-04-28

## 2021-11-16 RX ORDER — AMLODIPINE BESYLATE 10 MG/1
10 TABLET ORAL DAILY
Qty: 90 TABLET | Refills: 0 | Status: SHIPPED | OUTPATIENT
Start: 2021-11-16 | End: 2022-02-05 | Stop reason: SDUPTHER

## 2021-11-16 NOTE — PROGRESS NOTES
SUBJECTIVE:  Lan Knott is a 39 y.o. female [address] [place]   Chief Complaint   Patient presents with    Diabetes    Other      PT HERE FOR EVAL    HTN - TAKING MED. BP NOTED. ? DIET / EXERCISE COMPLIANCE, [address], NO DIZZINESS  DM - Robyn Keller.  ? EXERCISE / DIET  COMPLIANCE . HBS - LOW  TO [id number]  . LAST EYE EXAM - ? 2020  VIT D DEF - LAB D/W PT  IRON DEF ANEMIA - STATES S/P RECENT IV IRON . FATIGUE - IMPROVED. NO COLD INTOLERANCE. PT FOLLOWING WITH HEMATOLOGY  POLYCYSTIC OVARY SYNDROME  - FOLLOWING WITH GYNE.   MORBID OBESITY - DIET / EXERCISE REVIEWED. WT NOTED  PALPITATIONS -  RESOLVED       DENIES CP, No SOB,  PALPITATIONS - RESOLVED, No COUGH, NO F/C  No ABD PAIN, No N/V, No DIARRHEA, No CONSTIPATION, No MELENA, No HEMATOCHEZIA. No DYSURIA, No FREQ, No URGENCY, No HEMATURIA    PMH: REVIEWED AND UPDATED TODAY    PSH: REVIEWED AND UPDATED TODAY    SOCIAL HX: REVIEWED AND UPDATED TODAY    FAMILY HX: REVIEWED AND UPDATED TODAY    ALLERGY:  Patient has no known allergies. MEDS: REVIEWED  Prior to Visit Medications    Medication Sig Taking? Authorizing Provider   amLODIPine (NORVASC) 10 MG tablet Take 1 tablet by mouth daily Yes Susan Newby MD   metFORMIN (GLUCOPHAGE) 1000 MG tablet Take 1 tablet by mouth 2 times daily (with meals) Yes Susan Newby MD   Polysaccharide Iron Complex (IRON UP PO) Take by mouth Yes Historical Provider, MD       ROS: COMPREHENSIVE ROS AS IN HX, REST -VE  History obtained from chart review and the patient       OBJECTIVE:   NURSING NOTE AND VITALS REVIEWED  /80   Pulse 98   Temp 96.6 °F (35.9 °C)   Ht 5' 6\" (1.676 m)   Wt 295 lb (133.8 kg)   SpO2 99%   Breastfeeding No   BMI 47.61 kg/m²     NO ACUTE DISTRESS    REPEAT BP: 130/80 (LT), NO ORTHOSTASIS     REPEAT PULSE: 84 - MANUAL    Body mass index is 47.61 kg/m².      HEENT: MILD PALLOR, ANICTERIC, PERRLA, EOMI, NO CONJUNCTIVAL ERYTHEMA,                 NO SINUS TENDERNESS  NECK:  SUPPLE,

## 2021-11-16 NOTE — PATIENT INSTRUCTIONS
TAKE MED AS ADVISED    DIET/ EXERCISE.     FOLLOW UP WITHIN 3 MONTHS / AS NEEDED    FOLLOW UP FOR EYE EXAM

## 2021-11-19 ENCOUNTER — TELEPHONE (OUTPATIENT)
Dept: INTERNAL MEDICINE CLINIC | Age: 36
End: 2021-11-19

## 2021-11-19 NOTE — TELEPHONE ENCOUNTER
----- Message from Sparkle Perry sent at 11/19/2021 10:01 AM EST -----  Subject: Message to Provider    QUESTIONS  Information for Provider? Yani Clarke was just there on 11-. She forgot   to ask about birth control. She wants to know if the doctor could call her   in a birth control.  ---------------------------------------------------------------------------  --------------  [address]  What is the best way for the office to contact you? OK to leave message on   voicemail  Preferred Call Back Phone Number? [phone]  ---------------------------------------------------------------------------  --------------  SCRIPT ANSWERS  Relationship to Patient?  Self

## 2021-11-19 NOTE — TELEPHONE ENCOUNTER
Pt is asking for a birth control       I I called pt to let her know that dr was out till Monday but her message will be sent to her and addressed at the time got the v/m left message

## 2022-01-15 ENCOUNTER — CLINICAL DOCUMENTATION (OUTPATIENT)
Dept: OTHER | Age: 37
End: 2022-01-15

## 2022-02-05 DIAGNOSIS — I15.2 HYPERTENSION ASSOCIATED WITH DIABETES (HCC): ICD-10-CM

## 2022-02-05 DIAGNOSIS — E11.9 TYPE 2 DIABETES MELLITUS WITHOUT COMPLICATION, WITHOUT LONG-TERM CURRENT USE OF INSULIN (HCC): ICD-10-CM

## 2022-02-05 DIAGNOSIS — E11.59 HYPERTENSION ASSOCIATED WITH DIABETES (HCC): ICD-10-CM

## 2022-02-09 RX ORDER — AMLODIPINE BESYLATE 10 MG/1
10 TABLET ORAL DAILY
Qty: 90 TABLET | Refills: 0 | Status: SHIPPED | OUTPATIENT
Start: 2022-02-09 | End: 2022-04-28 | Stop reason: SDUPTHER

## 2022-04-27 SDOH — HEALTH STABILITY: PHYSICAL HEALTH: ON AVERAGE, HOW MANY DAYS PER WEEK DO YOU ENGAGE IN MODERATE TO STRENUOUS EXERCISE (LIKE A BRISK WALK)?: 4 DAYS

## 2022-04-27 SDOH — HEALTH STABILITY: PHYSICAL HEALTH: ON AVERAGE, HOW MANY MINUTES DO YOU ENGAGE IN EXERCISE AT THIS LEVEL?: 60 MIN

## 2022-04-28 ENCOUNTER — HOSPITAL ENCOUNTER (OUTPATIENT)
Dept: GENERAL RADIOLOGY | Age: 37
Discharge: HOME | End: 2022-04-28
Payer: MEDICAID

## 2022-04-28 ENCOUNTER — OFFICE VISIT (OUTPATIENT)
Dept: PRIMARY CARE CLINIC | Age: 37
End: 2022-04-28
Payer: MEDICAID

## 2022-04-28 VITALS
WEIGHT: 293 LBS | TEMPERATURE: 98 F | DIASTOLIC BLOOD PRESSURE: 82 MMHG | HEART RATE: 77 BPM | HEIGHT: 66 IN | SYSTOLIC BLOOD PRESSURE: 148 MMHG | BODY MASS INDEX: 47.09 KG/M2

## 2022-04-28 DIAGNOSIS — I10 ESSENTIAL HYPERTENSION: ICD-10-CM

## 2022-04-28 DIAGNOSIS — E66.01 CLASS 3 SEVERE OBESITY DUE TO EXCESS CALORIES WITH SERIOUS COMORBIDITY AND BODY MASS INDEX (BMI) OF 50.0 TO 59.9 IN ADULT (HCC): ICD-10-CM

## 2022-04-28 DIAGNOSIS — I15.2 HYPERTENSION ASSOCIATED WITH DIABETES (HCC): ICD-10-CM

## 2022-04-28 DIAGNOSIS — E11.59 HYPERTENSION ASSOCIATED WITH DIABETES (HCC): ICD-10-CM

## 2022-04-28 DIAGNOSIS — E11.9 TYPE 2 DIABETES MELLITUS WITHOUT COMPLICATION, WITHOUT LONG-TERM CURRENT USE OF INSULIN (HCC): ICD-10-CM

## 2022-04-28 DIAGNOSIS — E55.9 VITAMIN D DEFICIENCY: ICD-10-CM

## 2022-04-28 DIAGNOSIS — E28.2 PCOS (POLYCYSTIC OVARIAN SYNDROME): ICD-10-CM

## 2022-04-28 DIAGNOSIS — D50.8 OTHER IRON DEFICIENCY ANEMIA: ICD-10-CM

## 2022-04-28 DIAGNOSIS — Z00.00 ANNUAL PHYSICAL EXAM: Primary | ICD-10-CM

## 2022-04-28 DIAGNOSIS — M25.531 RIGHT WRIST PAIN: ICD-10-CM

## 2022-04-28 DIAGNOSIS — N92.1 MENORRHAGIA WITH IRREGULAR CYCLE: ICD-10-CM

## 2022-04-28 DIAGNOSIS — Z00.00 ANNUAL PHYSICAL EXAM: ICD-10-CM

## 2022-04-28 DIAGNOSIS — D50.9 MICROCYTIC ANEMIA: ICD-10-CM

## 2022-04-28 PROBLEM — E66.813 CLASS 3 SEVERE OBESITY DUE TO EXCESS CALORIES WITH SERIOUS COMORBIDITY AND BODY MASS INDEX (BMI) OF 50.0 TO 59.9 IN ADULT: Status: ACTIVE | Noted: 2022-04-28

## 2022-04-28 PROBLEM — D64.9 ABSOLUTE ANEMIA: Status: ACTIVE | Noted: 2022-04-28

## 2022-04-28 LAB
BASOPHILS ABSOLUTE: 0 K/UL (ref 0–0.2)
BASOPHILS RELATIVE PERCENT: 0.7 %
EOSINOPHILS ABSOLUTE: 0.1 K/UL (ref 0–0.6)
EOSINOPHILS RELATIVE PERCENT: 2.6 %
HCT VFR BLD CALC: 37.1 % (ref 36–48)
HEMOGLOBIN: 12.2 G/DL (ref 12–16)
LYMPHOCYTES ABSOLUTE: 1.7 K/UL (ref 1–5.1)
LYMPHOCYTES RELATIVE PERCENT: 34.7 %
MCH RBC QN AUTO: 28.5 PG (ref 26–34)
MCHC RBC AUTO-ENTMCNC: 33 G/DL (ref 31–36)
MCV RBC AUTO: 86.4 FL (ref 80–100)
MONOCYTES ABSOLUTE: 0.3 K/UL (ref 0–1.3)
MONOCYTES RELATIVE PERCENT: 6.4 %
NEUTROPHILS ABSOLUTE: 2.8 K/UL (ref 1.7–7.7)
NEUTROPHILS RELATIVE PERCENT: 55.6 %
PDW BLD-RTO: 13 % (ref 12.4–15.4)
PLATELET # BLD: 302 K/UL (ref 135–450)
PMV BLD AUTO: 9.5 FL (ref 5–10.5)
RBC # BLD: 4.29 M/UL (ref 4–5.2)
WBC # BLD: 5 K/UL (ref 4–11)

## 2022-04-28 PROCEDURE — 73110 X-RAY EXAM OF WRIST: CPT

## 2022-04-28 PROCEDURE — 99385 PREV VISIT NEW AGE 18-39: CPT | Performed by: STUDENT IN AN ORGANIZED HEALTH CARE EDUCATION/TRAINING PROGRAM

## 2022-04-28 PROCEDURE — 99214 OFFICE O/P EST MOD 30 MIN: CPT | Performed by: STUDENT IN AN ORGANIZED HEALTH CARE EDUCATION/TRAINING PROGRAM

## 2022-04-28 RX ORDER — HYDROCHLOROTHIAZIDE 12.5 MG/1
12.5 CAPSULE, GELATIN COATED ORAL EVERY MORNING
Qty: 30 CAPSULE | Refills: 5 | Status: SHIPPED | OUTPATIENT
Start: 2022-04-28 | End: 2022-08-26

## 2022-04-28 RX ORDER — AMLODIPINE BESYLATE 10 MG/1
10 TABLET ORAL DAILY
Qty: 90 TABLET | Refills: 3 | Status: SHIPPED | OUTPATIENT
Start: 2022-04-28

## 2022-04-28 ASSESSMENT — ENCOUNTER SYMPTOMS
DIARRHEA: 0
NAUSEA: 0

## 2022-04-28 NOTE — PROGRESS NOTES
[facility]  Establish care visit   2022    Danilo Reid (:  1985) is a 39 y.o. female, here to establish care. Chief Complaint   Patient presents with   [address] [facility] Other     blood work     Hypertension    Diabetes        ASSESSMENT/ PLAN  1. Annual physical exam  Screening labs ordered, follow-up with OB/GYN for Pap smear, recommend 150 minutes of exercise weekly and healthy dietary choices  - Comprehensive Metabolic Panel; Future  - Hemoglobin A1C; Future  - Lipid Panel; Future  - Hepatitis C Antibody; Future  - HIV Screen; Future    2. Essential hypertension  Uncontrolled, continue amlodipine and add hydrochlorothiazide  - hydroCHLOROthiazide (MICROZIDE) 12.5 MG capsule; Take 1 capsule by mouth every morning  Dispense: 30 capsule; Refill: 5  - Comprehensive Metabolic Panel; Future  - Lipid Panel; Future    3. Vitamin D deficiency  Not on supplementation, recheck today  - Vitamin D 25 Hydroxy; Future    4. Type 2 diabetes mellitus without complication, without long-term current use of insulin (HCC)  Controlled, recheck A1c. Continue metformin, check lipid panel, LFTs and urine creatinine. Referral for eye exam and bariatric surgery  - metFORMIN (GLUCOPHAGE) 1000 MG tablet; Take 1 tablet by mouth 2 times daily (with meals)  Dispense: 180 tablet; Refill: 3  - Microalbumin / Creatinine Urine Ratio; Future  - AFL - Jeet Mora, [address], Optometry, [facility], [address], DO, Bariatric Surgery, [facility]    5. Hypertension associated with diabetes ([facility] [address]  - amLODIPine (NORVASC) 10 MG tablet; Take 1 tablet by mouth daily  Dispense: 90 tablet; Refill: 3  - [place] Baez DO, Bariatric Surgery, [facility]    6. Class 3 severe obesity due to excess calories with serious comorbidity and body mass index (BMI) of 50.0 to 59.9 in [facility])  Complicates all aspects of patient's care  - [address], DO, Bariatric Surgery, [facility]    7. Right wrist pain  Uncontrolled, this is a new problem. Check x-ray to rule out fracture, referral to physical therapy if unremarkable. Continue wearing brace as needed  - XR WRIST RIGHT (MIN 3 VIEWS); Future    8. PCOS (polycystic ovarian syndrome)  9. Menorrhagia with irregular cycle  10. Other iron deficiency anemia  Recommend IUD or uterine ablation to control bleeding. Recheck iron levels  - CBC with Auto Differential; Future  - Iron and TIBC; Future       Return in about 4 weeks (around 5/26/2022) for blood pressure follow-up, diabetes follow up, weight check. HPI  Patient presents today to establish care with concerns of diabetes, hypertension, obesity, anemia and right wrist pain. Patient currently taking metformin for diabetes with good control. She states that her  cooks, and she eats a healthy diet. She notes that her daughter was born at 23 weeks, and she gained a significant amount of weight from eating food in the hospital while she was in the NICU. Her daughter is now 10 months old and doing well. She has been trying to increase her exercise by mall walking, but would like to undergo consultation for bariatric surgery. She has heavy menstrual cycles, PCOS which is led to iron deficiency anemia. She typically undergoes Venofer infusions twice yearly. She is done having children, would like to discuss options for managing her heavy menstrual cycles. She states that her  does not want her to get an IUD-could not specify why. She states that she woke up 1 night to make her daughter a bottle, and she hit her wrist on the countertop. She has been wearing a brace since that time, and endorses swelling and pain. This occurred a month ago. She has been taking her amlodipine as prescribed for hypertension. Patient lives at home with her daughter and . She does not work outside the home. No significant family history of cancer.   She is due for a Pap smear.    ROS  Review of Systems   Constitutional: Negative for fatigue and unexpected weight change. Eyes: Negative for visual disturbance. Cardiovascular: Negative for chest pain and leg swelling. Gastrointestinal: Negative for diarrhea and nausea. Endocrine: Negative for polydipsia, polyphagia and polyuria. Genitourinary: Negative for dysuria. Musculoskeletal: Positive for arthralgias. Skin: Negative for wound. Neurological: Negative for numbness. HISTORIES  No current outpatient medications on file prior to visit. No current facility-administered medications on file prior to visit. 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         Past Medical History:   Diagnosis Date    Diabetes mellitus ([facility] [address]     Hypertension     Iron deficiency anemia     Polycystic ovary syndrome        Patient Active Problem List   Diagnosis    Essential hypertension    Type 2 diabetes mellitus without complication, without long-term current use of insulin ([facility])    Menorrhagia with irregular cycle    PCOS (polycystic ovarian syndrome)    Class 3 severe obesity due to excess calories with serious comorbidity and body mass index (BMI) of 50.0 to 59.9 in adult ([facility] [address]    Hypertension associated with diabetes ([facility] [address]    Vitamin D deficiency    Absolute anemia       Past Surgical History:   Procedure Laterality Date     SECTION      SHOULDER SURGERY Right        Social History     Socioeconomic History    Marital status:      Spouse name: jesse    Number of children: 1    Years of education: Not on file    Highest education level: Not on file   Occupational History    Occupation: Stay at home mom   Tobacco Use    Smoking status: Never Smoker    Smokeless tobacco: Never Used   Vaping Use    Vaping Use: Never used   Substance and Sexual Activity    Alcohol use:  Yes     Alcohol/week: 1.0 standard drink     Types: 1 Glasses of wine per week     Comment: 1 EVERY 2 WEEKS    Drug use: Never    Sexual activity: Yes     Partners: Male     Comment:    Other Topics Concern    Not on file   Social History Narrative    She lives her  and child      Social Determinants of Health     Financial Resource Strain:     Difficulty of Paying Living Expenses: Not on file   Food Insecurity:     Worried About Running Out of Food in the Last Year: Not on file    Halima of Food in the Last Year: Not on file   Transportation Needs:     Lack of Transportation (Medical): Not on file    Lack of Transportation (Non-Medical):  Not on file   Physical Activity: Sufficiently Active    Days of Exercise per Week: 4 days    Minutes of Exercise per Session: 60 min   Stress:     Feeling of Stress : Not on file   Social Connections:     Frequency of Communication with Friends and Family: Not on file    Frequency of Social Gatherings with Friends and Family: Not on file    Attends Evangelical Services: Not on file    Active Member of Clubs or Organizations: Not on file    Attends Club or Organization Meetings: Not on file    Marital Status: Not on file   Intimate Partner Violence: Not At Risk    Fear of Current or Ex-Partner: No    Emotionally Abused: No    Physically Abused: No    Sexually Abused: No   Housing Stability:     Unable to Pay for Housing in the Last Year: Not on file    Number of [place] in the Last Year: Not on file    Unstable Housing in the Last Year: Not on file        Family History   Problem Relation Age of Onset    High Blood Pressure Mother     Diabetes Mother     High Blood Pressure Father     Colon Cancer Maternal Grandmother        PE  Vitals:    04/28/22 1341   BP: (!) 148/82   Site: Left Upper Arm   Position: Sitting   Cuff Size: Large Adult   Pulse: 77   Temp: 98 °F (36.7 °C)   TempSrc: Temporal   Weight: (!) 312 lb 9.6 oz (141.8 kg)   Height: 5' 6\" (1.676 m) Estimated body mass index is 50.45 kg/m² as calculated from the following:    Height as of this encounter: 5' 6\" (1.676 m). Weight as of this encounter: 312 lb 9.6 oz (141.8 kg). Physical Exam  Vitals reviewed. Constitutional:       Appearance: Normal appearance. She is obese. HENT:      Right Ear: Tympanic membrane normal.      Left Ear: Tympanic membrane normal.      Mouth/Throat:      Mouth: Mucous membranes are moist.      Pharynx: Oropharynx is clear. Eyes:      Extraocular Movements: Extraocular movements intact. Pupils: Pupils are equal, round, and reactive to light. Cardiovascular:      Rate and Rhythm: Normal rate and regular rhythm. Pulses: Normal pulses. Heart sounds: Normal heart sounds. Pulmonary:      Effort: Pulmonary effort is normal.      Breath sounds: Normal breath sounds. Musculoskeletal:      Right lower leg: No edema. Left lower leg: No edema. Comments: Visual inspection:  Deformity/amputation: Absent  Skin lesions/pre-ulcerative calluses: Absent    Sensory exam:  Monofilament sensation: Normal     Plus at least one of the following:  Pulses: Normal     Global tenderness to palpation right wrist, pain with wrist flexion, edema. Fingers neurovascularly intact   Skin:     General: Skin is warm and dry. Neurological:      Mental Status: She is alert. Sensory: No sensory deficit.    Psychiatric:         Mood and Affect: Mood normal.         Behavior: Behavior normal.         Immunization History   Administered Date(s) Administered    COVID-19, Moderna, Primary or Immunocompromised, PF, 100mcg/0.5mL 09/09/2021, 09/09/2021, 10/07/2021, 10/07/2021    DTaP, 5 Pertussis Antigens (Daptacel) 04/29/2009    Influenza Virus Vaccine 10/04/2016, 11/27/2017, 11/27/2017, 09/23/2020    Meningococcal B, OMV (Bexsero) 08/02/2021, 08/03/2021    Pneumococcal Polysaccharide ([id number]) 04/29/2009       Health Maintenance   Topic Date Due    Diabetic foot exam  Never done    HIV screen  Never done    Diabetic retinal exam  Never done    Hepatitis C screen  Never done    Hepatitis B vaccine (1 of 3 - Risk 3-dose series) Never done    Pneumococcal 0-64 years Vaccine (2 - PCV) 04/29/2010    Cervical cancer screen  Never done    DTaP/Tdap/Td vaccine (2 - Tdap) 04/29/2019    COVID-19 Vaccine (3 - Booster for Moderna series) 03/07/2022    Flu vaccine (Season Ended) 09/01/2022    A1C test (Diabetic or Prediabetic)  10/05/2022    Depression Screen  10/05/2022    Diabetic microalbuminuria test  11/10/2022    Lipids  11/10/2022    Potassium  11/10/2022    Creatinine  11/10/2022    Hepatitis A vaccine  Aged Out    Hib vaccine  Aged Out    Meningococcal (ACWY) vaccine  Aged Out    Varicella vaccine  Discontinued       PSH, PMH, SH and FH reviewed and noted. Recent and past labs, tests and consults also reviewed. Recent or new meds also reviewed. Jonathan Adams,     This dictation was generated by voice recognition computer software. Although all attempts are made to edit the dictation for accuracy, there may be errors in the transcription that are not intended.

## 2022-04-28 NOTE — PATIENT INSTRUCTIONS
[address], [address] [facility], [address] [place] [address], [address]  Phone: [phone]          Patient Education        Iron Deficiency Anemia: Care Instructions  Your Care Instructions     Anemia means that you don't have enough red blood cells. Red blood cells carry oxygen around your body. When you have anemia, it can make you pale, weak, andtired. Many things can cause anemia. The most common cause is loss of blood. This can happen if you have heavy menstrual periods. It can also happen if you havebleeding in your stomach or bowel. You can also get anemia if you don't have enough iron in your diet or if it's hard for your body to absorb iron. In some cases, pregnancy causes anemia. That's because a pregnant woman needs more iron. Your doctor may do more tests to find the cause of your anemia. If a disease orother health problem is causing it, your doctor will treat that problem. It's important to follow up with your doctor to make sure that your iron levelreturns to normal.  Follow-up care is a key part of your treatment and safety. Be sure to make and go to all appointments, and call your doctor if you are having problems. It's also a good idea to know your test results and keep alist of the medicines you take. How can you care for yourself at home?  If your doctor recommended iron pills, take them as directed. ? Try to take the pills on an empty stomach. You can do this about 1 hour before or 2 hours after meals. But you may need to take iron with food to avoid an upset stomach. ? Do not take antacids or drink milk or anything with caffeine within 2 hours of when you take your iron. They can keep your body from absorbing the iron well. ? Vitamin C helps your body absorb iron. You may want to take iron pills with a glass of orange juice or some other food high in vitamin C.  ? Iron pills may cause stomach problems.  These include heartburn, nausea, diarrhea, constipation, and cramps. It can help to drink plenty of fluids and include fruits, vegetables, and fiber in your diet. ? It's normal for iron pills to make your stool a greenish or grayish black. But internal bleeding can also cause dark stool. So it's important to tell your doctor about any color changes. ? Call your doctor if you think you are having a problem with your iron pills. Even after you start to feel better, it will take several months for your body to build up its supply of iron. ? If you miss a pill, don't take a double dose. ? Keep iron pills out of the reach of small children. Too much iron can be very dangerous.  Eat foods with a lot of iron. These include red meat, shellfish, poultry, and eggs. They also include beans, raisins, whole-grain bread, and leafy green vegetables.  Steam your vegetables. This is the best way to prepare them if you want to get as much iron as possible.  Be safe with medicines. Do not take nonsteroidal anti-inflammatory pain relievers unless your doctor tells you to. These include aspirin, naproxen (Aleve), and ibuprofen (Advil, Motrin).  Liquid iron can stain your teeth. But you can mix it with water or juice and drink it with a straw. Then it won't get on your teeth. When should you call for help? Call 911 anytime you think you may need emergency care. For example, call if:     You passed out (lost consciousness). Call your doctor now or seek immediate medical care if:     You are short of breath.      You are dizzy or light-headed, or you feel like you may faint.      You have new or worse bleeding. Watch closely for changes in your health, and be sure to contact your doctor if:     You feel weaker or more tired than usual.      You do not get better as expected. Where can you learn more? Go to [email or web address]. org and sign in to your [employer] account.  Enter D811 in the [employer] box to learn more about \"Iron Deficiency Anemia: Care Instructions. \"     If you do not have an account, please click on the \"Sign Up Now\" link. Current as of: November 29, 2021               Content Version: 13.2  © 3581-5385 Healthwise, Incorporated. Care instructions adapted under license by [facility] ([facility]). If you have questions about a medical condition or this instruction, always ask your healthcare professional. [address] any warranty or liability for your use of this information.

## 2022-04-29 LAB
A/G RATIO: 1.1 (ref 1.1–2.2)
ALBUMIN SERPL-MCNC: 4.5 G/DL (ref 3.4–5)
ALP BLD-CCNC: 108 U/L (ref 40–129)
ALT SERPL-CCNC: 9 U/L (ref 10–40)
ANION GAP SERPL CALCULATED.3IONS-SCNC: 17 MMOL/L (ref 3–16)
AST SERPL-CCNC: 10 U/L (ref 15–37)
BILIRUB SERPL-MCNC: 0.7 MG/DL (ref 0–1)
BUN BLDV-MCNC: 12 MG/DL (ref 7–20)
CALCIUM SERPL-MCNC: 9.7 MG/DL (ref 8.3–10.6)
CHLORIDE BLD-SCNC: 103 MMOL/L (ref 99–110)
CHOLESTEROL, TOTAL: 143 MG/DL (ref 0–199)
CO2: 21 MMOL/L (ref 21–32)
CREAT SERPL-MCNC: 0.7 MG/DL (ref 0.6–1.1)
CREATININE URINE: 167.2 MG/DL (ref 28–259)
ESTIMATED AVERAGE GLUCOSE: 148.5 MG/DL
FERRITIN: 105.6 NG/ML (ref 15–150)
GFR AFRICAN AMERICAN: >60
GFR NON-AFRICAN AMERICAN: >60
GLUCOSE BLD-MCNC: 147 MG/DL (ref 70–99)
HBA1C MFR BLD: 6.8 %
HDLC SERPL-MCNC: 40 MG/DL (ref 40–60)
HEPATITIS C ANTIBODY INTERPRETATION: NORMAL
HIV AG/AB: NORMAL
HIV ANTIGEN: NORMAL
HIV-1 ANTIBODY: NORMAL
HIV-2 AB: NORMAL
IRON SATURATION: 23 % (ref 15–50)
IRON: 100 UG/DL (ref 37–145)
LDL CHOLESTEROL CALCULATED: 87 MG/DL
MICROALBUMIN UR-MCNC: <1.2 MG/DL
MICROALBUMIN/CREAT UR-RTO: NORMAL MG/G (ref 0–30)
POTASSIUM SERPL-SCNC: 4.1 MMOL/L (ref 3.5–5.1)
SODIUM BLD-SCNC: 141 MMOL/L (ref 136–145)
TOTAL IRON BINDING CAPACITY: 435 UG/DL (ref 260–445)
TOTAL PROTEIN: 8.5 G/DL (ref 6.4–8.2)
TRIGL SERPL-MCNC: 80 MG/DL (ref 0–150)
VITAMIN D 25-HYDROXY: 18.7 NG/ML
VLDLC SERPL CALC-MCNC: 16 MG/DL

## 2022-05-18 DIAGNOSIS — M25.531 RIGHT WRIST PAIN: Primary | ICD-10-CM

## 2022-05-22 DIAGNOSIS — I10 ESSENTIAL HYPERTENSION: ICD-10-CM

## 2022-05-23 RX ORDER — HYDROCHLOROTHIAZIDE 12.5 MG/1
CAPSULE, GELATIN COATED ORAL
Qty: 30 CAPSULE | Refills: 5 | OUTPATIENT
Start: 2022-05-23

## 2022-05-23 NOTE — TELEPHONE ENCOUNTER
Medication:   Requested Prescriptions     Pending Prescriptions Disp Refills    hydroCHLOROthiazide (MICROZIDE) 12.5 MG capsule [Pharmacy Med Name: HYDROCHLOROTHIAZIDE 12.5 MG CP] 30 capsule 5     Sig: TAKE 1 CAPSULE BY MOUTH EVERY DAY IN THE MORNING        Last Filled:  4/28/22     Patient Phone Number: [phone] (home)     Last appt: 4/28/2022   Next appt: Visit date not found    Last OARRS: No flowsheet data found.

## 2022-05-23 NOTE — TELEPHONE ENCOUNTER
Called pt and left her a voicemail letting her know she has 5 refills at the [facility].

## 2022-05-24 ENCOUNTER — TELEPHONE (OUTPATIENT)
Dept: PRIMARY CARE CLINIC | Age: 37
End: 2022-05-24

## 2022-05-24 NOTE — TELEPHONE ENCOUNTER
----- Message from Jere Thapa sent at 5/24/2022  1:13 PM EDT -----  Subject: Message to Provider    QUESTIONS  Information for Provider? Patient concerned about some paperwork from last   ov had her maiden name on it Drea Greenberg still needs everything to be new    name So Bean she just wants it all to be one name. let   ptient know   ---------------------------------------------------------------------------  --------------  CALL BACK INFO  What is the best way for the office to contact you? OK to leave message on   voicemail  Preferred Call Back Phone Number? [phone]  ---------------------------------------------------------------------------  --------------  SCRIPT ANSWERS  Relationship to Patient?  Self

## 2022-05-25 ENCOUNTER — TELEPHONE (OUTPATIENT)
Dept: ORTHOPEDIC SURGERY | Age: 37
End: 2022-05-25

## 2022-05-25 NOTE — TELEPHONE ENCOUNTER
General Question     Subject: CAN PATIENT BRING HER 5MONTH OLD DAUGHTER?   Patient: Bea Chavarria Number: [phone]

## 2022-06-06 ENCOUNTER — TELEPHONE (OUTPATIENT)
Dept: BARIATRICS/WEIGHT MGMT | Age: 37
End: 2022-06-06

## 2022-06-06 NOTE — TELEPHONE ENCOUNTER
Patient received a call 6/3 regarding her insurance approval but pt states the name they said was not her, I do not see a note regarding this matter or the call she received.  Patient would like a call back

## 2022-08-26 DIAGNOSIS — I10 ESSENTIAL HYPERTENSION: ICD-10-CM

## 2022-08-26 RX ORDER — HYDROCHLOROTHIAZIDE 12.5 MG/1
CAPSULE, GELATIN COATED ORAL
Qty: 30 CAPSULE | Refills: 0 | Status: SHIPPED | OUTPATIENT
Start: 2022-08-26 | End: 2022-09-19

## 2022-08-26 NOTE — TELEPHONE ENCOUNTER
Medication:   Requested Prescriptions     Pending Prescriptions Disp Refills    hydroCHLOROthiazide (MICROZIDE) 12.5 MG capsule [Pharmacy Med Name: HYDROCHLOROTHIAZIDE 12.5 MG CP] 90 capsule 1     Sig: TAKE 1 CAPSULE BY MOUTH EVERY DAY IN THE MORNING       Last Filled:  4/28/2022     Patient Phone Number: [phone] (home)     Last appt: 4/28/2022   Next appt: Visit date not found    Last Labs DM:   Lab Results   Component Value Date/Time    LABA1C 6.8 04/28/2022 02:17 PM         Return in about 4 weeks (around 5/26/2022) for blood pressure follow-up, diabetes follow up, weight check.

## 2022-09-08 ENCOUNTER — TELEPHONE (OUTPATIENT)
Dept: PRIMARY CARE CLINIC | Age: 37
End: 2022-09-08

## 2022-09-19 DIAGNOSIS — I10 ESSENTIAL HYPERTENSION: ICD-10-CM

## 2022-09-19 RX ORDER — HYDROCHLOROTHIAZIDE 12.5 MG/1
CAPSULE, GELATIN COATED ORAL
Qty: 30 CAPSULE | Refills: 0 | Status: SHIPPED | OUTPATIENT
Start: 2022-09-19 | End: 2022-10-06 | Stop reason: SDUPTHER

## 2022-09-19 NOTE — TELEPHONE ENCOUNTER
Medication:   Requested Prescriptions     Pending Prescriptions Disp Refills    hydroCHLOROthiazide (MICROZIDE) 12.5 MG capsule [Pharmacy Med Name: HYDROCHLOROTHIAZIDE 12.5 MG CP] 30 capsule 0     Sig: TAKE 1 CAPSULE BY MOUTH EVERY DAY IN THE MORNING       Last Filled:  8/26/2022    Patient Phone Number: [phone] (home)     Last appt: 4/28/2022   Next appt: Visit date not found    Last Labs DM:   Lab Results   Component Value Date/Time    LABA1C 6.8 04/28/2022 02:17 PM            Return in about 4 weeks (around 5/26/2022) for blood pressure follow-up, diabetes follow up, weight check

## 2022-10-05 DIAGNOSIS — I10 ESSENTIAL HYPERTENSION: ICD-10-CM

## 2022-10-05 RX ORDER — HYDROCHLOROTHIAZIDE 12.5 MG/1
CAPSULE, GELATIN COATED ORAL
Qty: 30 CAPSULE | Refills: 0 | Status: CANCELLED | OUTPATIENT
Start: 2022-10-05

## 2022-10-05 NOTE — PROGRESS NOTES
Medication:   Requested Prescriptions     Pending Prescriptions Disp Refills    hydroCHLOROthiazide (MICROZIDE) 12.5 MG capsule 30 capsule 0        Last Filled:      Patient Phone Number: [phone] (home)     Last appt: 4/28/2022   Next appt: Visit date not found  Return in about 4 weeks (around 5/26/2022) for blood pressure follow-up, diabetes follow up, weight check. Last OARRS: No flowsheet data found.

## 2022-10-05 NOTE — PROGRESS NOTES
Medication:   Requested Prescriptions     Pending Prescriptions Disp Refills    hydroCHLOROthiazide (MICROZIDE) 12.5 MG capsule 30 capsule 0        Last Filled:  09/19/22    Patient Phone Number: [phone] (home)     Last appt: 4/28/2022   Next appt: Visit date not found    Last OARRS: No flowsheet data found.

## 2022-10-06 RX ORDER — HYDROCHLOROTHIAZIDE 12.5 MG/1
12.5 CAPSULE, GELATIN COATED ORAL EVERY MORNING
Qty: 30 CAPSULE | Refills: 0 | Status: SHIPPED | OUTPATIENT
Start: 2022-10-06

## 2022-11-17 ENCOUNTER — TELEPHONE (OUTPATIENT)
Dept: PRIMARY CARE CLINIC | Age: 37
End: 2022-11-17

## 2022-11-17 ENCOUNTER — OFFICE VISIT (OUTPATIENT)
Dept: PRIMARY CARE CLINIC | Age: 37
End: 2022-11-17
Payer: MEDICAID

## 2022-11-17 VITALS
SYSTOLIC BLOOD PRESSURE: 145 MMHG | BODY MASS INDEX: 47.09 KG/M2 | HEART RATE: 89 BPM | HEIGHT: 66 IN | WEIGHT: 293 LBS | DIASTOLIC BLOOD PRESSURE: 90 MMHG | TEMPERATURE: 97 F

## 2022-11-17 DIAGNOSIS — E11.9 TYPE 2 DIABETES MELLITUS WITHOUT COMPLICATION, WITHOUT LONG-TERM CURRENT USE OF INSULIN (HCC): ICD-10-CM

## 2022-11-17 DIAGNOSIS — E66.01 CLASS 3 SEVERE OBESITY DUE TO EXCESS CALORIES WITH SERIOUS COMORBIDITY AND BODY MASS INDEX (BMI) OF 50.0 TO 59.9 IN ADULT (HCC): ICD-10-CM

## 2022-11-17 DIAGNOSIS — E11.9 TYPE 2 DIABETES MELLITUS WITHOUT COMPLICATION, WITHOUT LONG-TERM CURRENT USE OF INSULIN (HCC): Primary | ICD-10-CM

## 2022-11-17 DIAGNOSIS — I10 ESSENTIAL HYPERTENSION: Primary | ICD-10-CM

## 2022-11-17 LAB — HBA1C MFR BLD: 7.5 %

## 2022-11-17 PROCEDURE — 3051F HG A1C>EQUAL 7.0%<8.0%: CPT | Performed by: STUDENT IN AN ORGANIZED HEALTH CARE EDUCATION/TRAINING PROGRAM

## 2022-11-17 PROCEDURE — 3074F SYST BP LT 130 MM HG: CPT | Performed by: STUDENT IN AN ORGANIZED HEALTH CARE EDUCATION/TRAINING PROGRAM

## 2022-11-17 PROCEDURE — 3078F DIAST BP <80 MM HG: CPT | Performed by: STUDENT IN AN ORGANIZED HEALTH CARE EDUCATION/TRAINING PROGRAM

## 2022-11-17 PROCEDURE — 83036 HEMOGLOBIN GLYCOSYLATED A1C: CPT | Performed by: STUDENT IN AN ORGANIZED HEALTH CARE EDUCATION/TRAINING PROGRAM

## 2022-11-17 PROCEDURE — 99214 OFFICE O/P EST MOD 30 MIN: CPT | Performed by: STUDENT IN AN ORGANIZED HEALTH CARE EDUCATION/TRAINING PROGRAM

## 2022-11-17 RX ORDER — BLOOD-GLUCOSE METER
1 KIT MISCELLANEOUS DAILY
Qty: 1 KIT | Refills: 0 | Status: SHIPPED | OUTPATIENT
Start: 2022-11-17

## 2022-11-17 RX ORDER — NORGESTIMATE AND ETHINYL ESTRADIOL 0.25-0.035
KIT ORAL
COMMUNITY
Start: 2022-08-24

## 2022-11-17 RX ORDER — GLUCOSAMINE HCL/CHONDROITIN SU 500-400 MG
CAPSULE ORAL
Qty: 200 STRIP | Refills: 5 | Status: SHIPPED | OUTPATIENT
Start: 2022-11-17

## 2022-11-17 RX ORDER — LOSARTAN POTASSIUM 50 MG/1
50 TABLET ORAL DAILY
Qty: 30 TABLET | Refills: 5 | Status: SHIPPED | OUTPATIENT
Start: 2022-11-17

## 2022-11-17 RX ORDER — NORGESTIMATE AND ETHINYL ESTRADIOL 0.25-0.035
1 KIT ORAL DAILY
COMMUNITY
Start: 2022-05-05 | End: 2022-11-17

## 2022-11-17 RX ORDER — LANCETS 30 GAUGE
1 EACH MISCELLANEOUS 3 TIMES DAILY
Qty: 200 EACH | Refills: 5 | Status: SHIPPED | OUTPATIENT
Start: 2022-11-17

## 2022-11-17 SDOH — ECONOMIC STABILITY: FOOD INSECURITY: WITHIN THE PAST 12 MONTHS, YOU WORRIED THAT YOUR FOOD WOULD RUN OUT BEFORE YOU GOT MONEY TO BUY MORE.: NEVER TRUE

## 2022-11-17 SDOH — ECONOMIC STABILITY: FOOD INSECURITY: WITHIN THE PAST 12 MONTHS, THE FOOD YOU BOUGHT JUST DIDN'T LAST AND YOU DIDN'T HAVE MONEY TO GET MORE.: NEVER TRUE

## 2022-11-17 ASSESSMENT — PATIENT HEALTH QUESTIONNAIRE - PHQ9
2. FEELING DOWN, DEPRESSED OR HOPELESS: 0
SUM OF ALL RESPONSES TO PHQ QUESTIONS 1-9: 0
SUM OF ALL RESPONSES TO PHQ9 QUESTIONS 1 & 2: 0
1. LITTLE INTEREST OR PLEASURE IN DOING THINGS: 0
SUM OF ALL RESPONSES TO PHQ QUESTIONS 1-9: 0

## 2022-11-17 ASSESSMENT — SOCIAL DETERMINANTS OF HEALTH (SDOH): HOW HARD IS IT FOR YOU TO PAY FOR THE VERY BASICS LIKE FOOD, HOUSING, MEDICAL CARE, AND HEATING?: NOT HARD AT ALL

## 2022-11-17 ASSESSMENT — ENCOUNTER SYMPTOMS
DIARRHEA: 0
NAUSEA: 0

## 2022-11-17 NOTE — TELEPHONE ENCOUNTER
Tirzepatide [facility]) 2.5 MG/0.5ML SOPN SC injection [[id number]]     Order Details  Dose: 2.5 mg Route: SubCUTAneous Frequency: WEEKLY   Dispense Quantity: 4 Adjustable Dose Pre-filled Pen Syringe Refills: 0          Sig: Inject 0.5 mLs into the skin once a week         Start Date: 11/17/22 End Date: --   Written Date: 11/17/22 Expiration Date: 11/17/23       Diagnosis Association: Type 2 diabetes mellitus without complication, without long-term current use of insulin ([facility] [address] (E11.9)   Providers    Authorizing Provider: Aiden Jacobo DO NPI: [id number]   Ordering User:  Aiden Jacobo DO          Pharmacy    [address] [employer] [address] [place] [id number] [place] [address]   Phone:  [phone]  Fax:  [phone]     Order Class:    Order Class   Normal [1]     Warnings Override History    No Interaction Warnings Shown      Tirzepatide (MOUNJARO) 2.5 MG/0.5ML SOPN SC injection  Date: 11/17/2022 Department[facility]  [facility] Ordering/Authorizing: Aiden Jacobo DO     Outpatient Medication Detail     Disp Refills Start End    Tirzepatide [facility]) 2.5 MG/0.5ML SOPN SC injection 4 Adjustable Dose Pre-filled Pen Syringe 0 11/17/2022     Sig - Route: Inject 0.5 mLs into the skin once a week - SubCUTAneous    Sent to pharmacy as: Tirzepatide 2.5 MG/0.5ML Subcutaneous Solution Pen-injector [facility])    E-Prescribing Status: Receipt confirmed by pharmacy (11/17/2022 11:07 AM EST)

## 2022-11-17 NOTE — PROGRESS NOTES
[facility]  2022    Balbina Caldwell (:  1985) is a 44 y.o. female, here for evaluation of the following medical concerns:    Chief Complaint   Patient presents with    Follow-up     Bp medication and water pill         ASSESSMENT/ PLAN  1. Essential hypertension  Uncontrolled, continue hydrochlorothiazide. Discontinue amlodipine and initiate losartan in the setting of concurrent diabetes. If blood pressure is controlled at follow-up appointment, patient would like to combine hydrochlorothiazide and losartan into 1 tablet. - losartan (COZAAR) 50 MG tablet; Take 1 tablet by mouth daily  Dispense: 30 tablet; Refill: 5    2. Type 2 diabetes mellitus without complication, without long-term current use of insulin (HCC)  Controlled, although worsening. A1c 7.5 today. Continue metformin 2000 mg daily, initiate Mounjaro 2.5 mg injection weekly. Glucometer, lancets and test strips refilled. Follow-up in 4 weeks, consider increase of Mounjaro to 5 mg at that time. Risks, benefits and side effects of GLP-1 discussed. - POCT glycosylated hemoglobin (Hb A1C)  - glucose monitoring (FREESTYLE FREEDOM) kit; 1 kit by Does not apply route daily  Dispense: 1 kit; Refill: 0  - Lancets MISC; 1 each by Does not apply route 3 times daily  Dispense: 200 each; Refill: 5  - blood glucose monitor strips; Test 3 times a day & as needed for symptoms of irregular blood glucose. Dispense sufficient amount for indicated testing frequency plus additional to accommodate PRN testing needs. Dispense: 200 strip; Refill: 5  - Tirzepatide (MOUNJARO) 2.5 MG/0.5ML SOPN SC injection; Inject 0.5 mLs into the skin once a week  Dispense: 4 Adjustable Dose Pre-filled Pen Syringe; Refill: 0    3. Class 3 severe obesity due to excess calories with serious comorbidity and body mass index (BMI) of 50.0 to 59.9 in adult [facility])  Complicates all aspects of patient's care, follow-up with bariatric surgery as scheduled.     Return in about 4 weeks (around 12/15/2022) for blood pressure follow-up, diabetes follow up. HPI  Patient presents today for follow-up on type 2 diabetes, hypertension, obesity. Since previous appointment, she admits that she has not been as adherent to her diet or exercise plan. She is looking forward to her appointment with bariatric surgery to discuss options in the setting of chronic struggle with obesity and comorbid conditions. She has been taking hydrochlorothiazide, amlodipine daily as prescribed for hypertension with inadequate control. She has been taking metformin 2000 mg daily as prescribed for her diabetes. She is frustrated as her A1c has never been this uncontrolled previously. ROS  Review of Systems   Constitutional:  Negative for fatigue and unexpected weight change. Eyes:  Negative for visual disturbance. Cardiovascular:  Negative for chest pain and leg swelling. Gastrointestinal:  Negative for diarrhea and nausea. Endocrine: Negative for polydipsia, polyphagia and polyuria. Genitourinary:  Negative for dysuria. Skin:  Negative for wound. Neurological:  Negative for numbness. HISTORIES  Current Outpatient Medications on File Prior to Visit   Medication Sig Dispense Refill    KIMBERLEE 0.25-35 MG-MCG per tablet TAKE 1 TABLET BY MOUTH EVERY DAY      hydroCHLOROthiazide (MICROZIDE) 12.5 MG capsule Take 1 capsule by mouth every morning 30 capsule 0    metFORMIN (GLUCOPHAGE) 1000 MG tablet Take 1 tablet by mouth 2 times daily (with meals) 180 tablet 3     No current facility-administered medications on file prior to visit.       Past Medical History:   Diagnosis Date    Diabetes mellitus ([address]     Hypertension     Iron deficiency anemia     Polycystic ovary syndrome      Patient Active Problem List   Diagnosis    Essential hypertension    Type 2 diabetes mellitus without complication, without long-term current use of insulin ([address]    Menorrhagia with irregular cycle    PCOS (polycystic ovarian syndrome)    Class 3 severe obesity due to excess calories with serious comorbidity and body mass index (BMI) of 50.0 to 59.9 in adult [facility])    Hypertension associated with diabetes ([facility]    Vitamin D deficiency    Absolute anemia       PE  Vitals:    11/17/22 1022 11/17/22 1233   BP: (!) 157/79 (!) 145/90   Pulse: 89    Temp: 97 °F (36.1 °C)    TempSrc: Temporal    Weight: (!) 309 lb (140.2 kg)    Height: 5' 6\" (1.676 m)      Estimated body mass index is 49.87 kg/m² as calculated from the following:    Height as of this encounter: 5' 6\" (1.676 m). Weight as of this encounter: 309 lb (140.2 kg). Physical Exam  Vitals reviewed. Constitutional:       General: She is not in acute distress. Appearance: Normal appearance. She is obese. HENT:      Head: Normocephalic and atraumatic. Eyes:      Extraocular Movements: Extraocular movements intact. Pupils: Pupils are equal, round, and reactive to light. Cardiovascular:      Rate and Rhythm: Normal rate and regular rhythm. Pulses: Normal pulses. Heart sounds: Normal heart sounds. No murmur heard. No gallop. Pulmonary:      Effort: Pulmonary effort is normal.      Breath sounds: Normal breath sounds. No wheezing or rales. Abdominal:      General: Abdomen is flat. Palpations: Abdomen is soft. Musculoskeletal:      Right lower leg: No edema. Left lower leg: No edema. Skin:     General: Skin is warm and dry. Neurological:      Mental Status: She is alert. Aiden Jacobo DO    This dictation was generated by voice recognition computer software. Although all attempts are made to edit the dictation for accuracy, there may be errors in the transcription that are not intended.

## 2022-11-21 NOTE — TELEPHONE ENCOUNTER
Submitted PA for Mounjaro 2.5MG/0.5ML pen-injectors  Via CMM Key: [id number] STATUS: DENIED; Denial letter attached. If this requires a response please respond to the pool ( P [facility] [address] [facility]). Thank you please advise patient.

## 2022-11-21 NOTE — TELEPHONE ENCOUNTER
Please call pt and ask which medication she is talking about? If it is lynn we are still waiting to hear from [facility] department.

## 2022-11-25 DIAGNOSIS — E11.9 TYPE 2 DIABETES MELLITUS WITHOUT COMPLICATION, WITHOUT LONG-TERM CURRENT USE OF INSULIN (HCC): ICD-10-CM

## 2022-11-30 NOTE — TELEPHONE ENCOUNTER
Pt had not heard back from office, she said on the website it says the insurance company is waiting to hear back from the office. She would like to know what all is going on with this and if this is not going to be approved if something else will be called in.

## 2022-12-01 RX ORDER — SEMAGLUTIDE 1.34 MG/ML
0.25 INJECTION, SOLUTION SUBCUTANEOUS WEEKLY
Qty: 1.5 ML | Refills: 0 | Status: SHIPPED | OUTPATIENT
Start: 2022-12-01 | End: 2022-12-31

## 2022-12-02 ENCOUNTER — TELEPHONE (OUTPATIENT)
Dept: PRIMARY CARE CLINIC | Age: 37
End: 2022-12-02

## 2022-12-02 DIAGNOSIS — E66.01 CLASS 3 SEVERE OBESITY DUE TO EXCESS CALORIES WITH SERIOUS COMORBIDITY AND BODY MASS INDEX (BMI) OF 50.0 TO 59.9 IN ADULT (HCC): ICD-10-CM

## 2022-12-02 DIAGNOSIS — E11.9 TYPE 2 DIABETES MELLITUS WITHOUT COMPLICATION, WITHOUT LONG-TERM CURRENT USE OF INSULIN (HCC): Primary | ICD-10-CM

## 2022-12-02 NOTE — TELEPHONE ENCOUNTER
Pt called and said ozempic was denied per pharmacy. But they told her trulicity should get covered . Pt would like to talk to someone  about this.

## 2022-12-02 NOTE — TELEPHONE ENCOUNTER
PA STARTED ,pt also wanted to know if she should still take the metformin with the weekly injection.

## 2022-12-02 NOTE — TELEPHONE ENCOUNTER
Semaglutide,0.25 or 0.5MG/DOS, (OZEMPIC, 0.25 OR 0.5 MG/DOSE,) 2 MG/1.5ML SOPN [[id number]]     Order Details  Dose: 0.25 mg Route: SubCUTAneous Frequency: WEEKLY   Dispense Quantity: 1.5 mL Refills: 0          Sig: Inject 0.25 mg into the skin once a week         Start Date: 12/01/22 End Date: 12/31/22 after 5 doses   Written Date: 12/01/22 Expiration Date: 12/01/23       Diagnosis Association: Type 2 diabetes mellitus without complication, without long-term current use of insulin (HCC) (E11.9); Class 3 severe obesity due to excess calories with serious comorbidity and body mass index (BMI) of 50.0 to 59.9 in [facility]) (E66.01 , Z68.43)   Providers    Authorizing Provider: Carol Ann Ayala MD NPI: [id number]   Ordering User:  Carol Ann Ayala MD          Pharmacy    [facility]/pharmacy [place] [address]   Phone:  [phone]  Fax:  [phone]     Order Class:    Order Class   Normal [1]     Warnings Override History    No Interaction Warnings Shown      Semaglutide,0.25 or 0.5MG/DOS, (OZEMPIC, 0.25 OR 0.5 MG/DOSE,) 2 MG/1.5ML SOPN  Date: 12/1/2022 Department:  [facility] Ordering/Authorizing: Carol Ann Ayala MD     Outpatient Medication Detail     Disp Refills Start End    Semaglutide,0.25 or 0.5MG/DOS, (OZEMPIC, 0.25 OR 0.5 MG/DOSE,) 2 MG/1.5ML SOPN 1.5 mL 0 12/1/2022 12/31/2022    Sig - Route: Inject 0.25 mg into the skin once a week - SubCUTAneous    Sent to pharmacy as: Ozempic (0.25 or 0.5 MG/DOSE) 2 MG/1.5ML Solution Pen-injector (Semaglutide(0.25 or 0.5MG/DOS))    E-Prescribing Status: Receipt confirmed by pharmacy (12/1/2022  4:13 PM EST)

## 2022-12-05 NOTE — TELEPHONE ENCOUNTER
Submitted PA for Ozempic (0.25 or 0.5 MG/DOSE) 2MG/1.5ML pen-injectors  Via CMM Key: [id number] STATUS: DENIED; Denial letter attached. If this requires a response please respond to the pool ( P [facility] [address] [facility]). Thank you please advise patient.

## 2022-12-10 DIAGNOSIS — I10 ESSENTIAL HYPERTENSION: ICD-10-CM

## 2022-12-12 RX ORDER — LOSARTAN POTASSIUM 50 MG/1
TABLET ORAL
Qty: 30 TABLET | Refills: 5 | OUTPATIENT
Start: 2022-12-12

## 2022-12-14 DIAGNOSIS — E11.9 TYPE 2 DIABETES MELLITUS WITHOUT COMPLICATION, WITHOUT LONG-TERM CURRENT USE OF INSULIN (HCC): ICD-10-CM

## 2022-12-14 DIAGNOSIS — E66.01 CLASS 3 SEVERE OBESITY DUE TO EXCESS CALORIES WITH SERIOUS COMORBIDITY AND BODY MASS INDEX (BMI) OF 50.0 TO 59.9 IN ADULT (HCC): ICD-10-CM

## 2022-12-14 NOTE — PROGRESS NOTES
Medication:   Requested Prescriptions     Pending Prescriptions Disp Refills    Tirzepatide (MOUNJARO) 2.5 MG/0.5ML SOPN SC injection 2 mL 0     Sig: Inject 0.5 mLs into the skin once a week        Last Filled:  Can we change to Trulicity? This medication is not covered even after PA    Patient Phone Number: [phone] (home)     Last appt: 11/17/2022   Next appt: 1/3/2023    Last OARRS: No flowsheet data found.

## 2022-12-19 DIAGNOSIS — E66.01 CLASS 3 SEVERE OBESITY DUE TO EXCESS CALORIES WITH SERIOUS COMORBIDITY AND BODY MASS INDEX (BMI) OF 50.0 TO 59.9 IN ADULT (HCC): ICD-10-CM

## 2022-12-19 DIAGNOSIS — E11.9 TYPE 2 DIABETES MELLITUS WITHOUT COMPLICATION, WITHOUT LONG-TERM CURRENT USE OF INSULIN (HCC): Primary | ICD-10-CM

## 2022-12-19 RX ORDER — DULAGLUTIDE 1.5 MG/.5ML
1.5 INJECTION, SOLUTION SUBCUTANEOUS
Qty: 4 ADJUSTABLE DOSE PRE-FILLED PEN SYRINGE | Refills: 11 | Status: SHIPPED | OUTPATIENT
Start: 2022-12-19

## 2022-12-19 NOTE — PROGRESS NOTES
Please let patient know we received the denial form under her own Ozempic. Trulicity has been sent.   Dr. Brii West

## 2022-12-20 ENCOUNTER — TELEPHONE (OUTPATIENT)
Dept: PRIMARY CARE CLINIC | Age: 37
End: 2022-12-20

## 2022-12-20 NOTE — TELEPHONE ENCOUNTER
Pt called went to  trulicity . Pt said she has concerns when taking it. is she supposed  tostill be taking the metformin twice a day . She doesn't want to bottom out.

## 2022-12-20 NOTE — TELEPHONE ENCOUNTER
If she doesn't want to take it, she doesn't have to. It is not insulin. It will not bottom her out. She can take this weekly injection with her metformin as is.   Dr. Crystal Mckoy

## 2023-01-03 ENCOUNTER — OFFICE VISIT (OUTPATIENT)
Dept: PRIMARY CARE CLINIC | Age: 38
End: 2023-01-03

## 2023-01-03 VITALS
HEIGHT: 69 IN | WEIGHT: 293 LBS | TEMPERATURE: 97.2 F | HEART RATE: 89 BPM | SYSTOLIC BLOOD PRESSURE: 172 MMHG | BODY MASS INDEX: 43.4 KG/M2 | DIASTOLIC BLOOD PRESSURE: 90 MMHG | OXYGEN SATURATION: 100 %

## 2023-01-03 DIAGNOSIS — E11.9 TYPE 2 DIABETES MELLITUS WITHOUT COMPLICATION, WITHOUT LONG-TERM CURRENT USE OF INSULIN (HCC): ICD-10-CM

## 2023-01-03 DIAGNOSIS — Z23 NEED FOR IMMUNIZATION AGAINST INFLUENZA: ICD-10-CM

## 2023-01-03 DIAGNOSIS — R40.0 DAYTIME SLEEPINESS: ICD-10-CM

## 2023-01-03 DIAGNOSIS — E66.01 CLASS 3 SEVERE OBESITY DUE TO EXCESS CALORIES WITH SERIOUS COMORBIDITY AND BODY MASS INDEX (BMI) OF 50.0 TO 59.9 IN ADULT (HCC): ICD-10-CM

## 2023-01-03 DIAGNOSIS — I10 ESSENTIAL HYPERTENSION: Primary | ICD-10-CM

## 2023-01-03 RX ORDER — HYDROCHLOROTHIAZIDE 25 MG/1
25 TABLET ORAL EVERY MORNING
Qty: 30 TABLET | Refills: 1 | Status: SHIPPED | OUTPATIENT
Start: 2023-01-03

## 2023-01-03 ASSESSMENT — ENCOUNTER SYMPTOMS
DIARRHEA: 0
NAUSEA: 0

## 2023-01-03 ASSESSMENT — PATIENT HEALTH QUESTIONNAIRE - PHQ9
SUM OF ALL RESPONSES TO PHQ QUESTIONS 1-9: 0
2. FEELING DOWN, DEPRESSED OR HOPELESS: 0
SUM OF ALL RESPONSES TO PHQ QUESTIONS 1-9: 0
1. LITTLE INTEREST OR PLEASURE IN DOING THINGS: 0
SUM OF ALL RESPONSES TO PHQ9 QUESTIONS 1 & 2: 0

## 2023-01-03 NOTE — PROGRESS NOTES
Chief Complaint   Patient presents with    Hypertension    Follow-up     Trulicity Question           ASSESSMENT/PLAN:  1. Essential hypertension  Uncontrolled  Continue losartan 50 mg daily  Increase hydrochlorothiazide to 25 mg daily  Follow-up in 4 weeks to gauge improvement and titrate medication accordingly  Will continue to work on weight loss  Rule out sleep apnea in case it is contributing  - hydroCHLOROthiazide (HYDRODIURIL) 25 MG tablet; Take 1 tablet by mouth every morning  Dispense: 30 tablet; Refill: 1    2. Type 2 diabetes mellitus without complication, without long-term current use of insulin (HCC)  Uncontrolled, last A1c 8.2%  Picked up Trulicity yesterday on account of insurance issues and will start it today. Follow-up in 4 weeks to gauge improvement and make sure she tolerates this okay  Discussed side effects  Answered all questions    3. Class 3 severe obesity due to excess calories with serious comorbidity and body mass index (BMI) of 50.0 to 59.9 in adult [facility])  Complicating all aspects of patient's care  Rule out sleep apnea  Discussed dietary modifications and lifestyle changes to implement  - Kenzie Laura MD, Sleep Medicine, [facility]    4. Daytime sleepiness  As above, rule out sleep apnea  - Kenzie Laura MD, Sleep Medicine, [facility]    5. Need for immunization against influenza  Administered  - Influenza, FLUCELVAX, (age 10 mo+), IM, Multi-Dose Vial, 0.5 mL         HPI:  Yani Clarke is a 44 y.o. (: 1985) here today for chronic condition management. Hypertension  Rx: Hydrochlorothiazide 12.5 mg daily with addition of losartan 50 mg daily LOV  BP Readings from Last 3 Encounters:   23 (!) 172/90   22 (!) 145/90   22 (!) 148/82     Lab Results   Component Value Date    CREATININE 0.7 2022       DMT2  Rx: Metformin 2000 mg daily with addition of trulicity 2.5 mg weekly which she didn't start due to approval hold up.    Checking sugars at home: no  Hemoglobin A1C   Date Value Ref Range Status   11/17/2022 7.5 % Final     Lab Results   Component Value Date    LABMICR <1.20 04/28/2022     Severe obesity  Wt Readings from Last 3 Encounters:   01/03/23 (!) 311 lb (141.1 kg)   11/17/22 (!) 309 lb (140.2 kg)   04/28/22 (!) 312 lb 9.6 oz (141.8 kg)     Would like her flu shot. Review of Systems   Constitutional:  Negative for fatigue and unexpected weight change. Eyes:  Negative for visual disturbance. Cardiovascular:  Negative for chest pain and leg swelling. Gastrointestinal:  Negative for diarrhea and nausea. Endocrine: Negative for polydipsia, polyphagia and polyuria. Genitourinary:  Negative for dysuria. Skin:  Negative for wound. Neurological:  Negative for numbness. Past Medical History:   Diagnosis Date    Diabetes mellitus ([facility] [address]     Hypertension     Iron deficiency anemia     Polycystic ovary syndrome        Family History   Problem Relation Age of Onset    High Blood Pressure Mother     Diabetes Mother     High Blood Pressure Father     Colon Cancer Maternal Grandmother        Social History     Tobacco Use    Smoking status: Never    Smokeless tobacco: Never   Vaping Use    Vaping Use: Never used   Substance Use Topics    Alcohol use:  Yes     Alcohol/week: 1.0 standard drink     Types: 1 Glasses of wine per week     Comment: 1 EVERY 2 WEEKS    Drug use: Never       New Prescriptions    HYDROCHLOROTHIAZIDE (HYDRODIURIL) 25 MG TABLET    Take 1 tablet by mouth every morning       Meds Prior to visit:  Current Outpatient Medications on File Prior to Visit   Medication Sig Dispense Refill    dulaglutide (TRULICITY) 1.5 YK/2.8WN SC injection Inject 0.5 mLs into the skin every 7 days 4 Adjustable Dose Pre-filled Pen Syringe 11    metFORMIN (GLUCOPHAGE) 1000 MG tablet Take 1 tablet by mouth 2 times daily (with meals) 180 tablet 3    KIMBERLEE 0.25-35 MG-MCG per tablet TAKE 1 TABLET BY MOUTH EVERY DAY      losartan (COZAAR) 50 MG tablet Take 1 tablet by mouth daily 30 tablet 5    glucose monitoring (FREESTYLE FREEDOM) kit 1 kit by Does not apply route daily 1 kit 0    Lancets MISC 1 each by Does not apply route 3 times daily 200 each 5    blood glucose monitor strips Test 3 times a day & as needed for symptoms of irregular blood glucose. Dispense sufficient amount for indicated testing frequency plus additional to accommodate PRN testing needs. 200 strip 5     No current facility-administered medications on file prior to visit. 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 Constitutional:       General: She is not in acute distress. Appearance: Normal appearance. She is obese. HENT:      Head: Normocephalic and atraumatic. Eyes:      Extraocular Movements: Extraocular movements intact. Pupils: Pupils are equal, round, and reactive to light. Cardiovascular:      Rate and Rhythm: Normal rate and regular rhythm. Pulses: Normal pulses. Heart sounds: Normal heart sounds. No murmur heard. No gallop. Pulmonary:      Effort: Pulmonary effort is normal.      Breath sounds: Normal breath sounds. No wheezing or rales. Abdominal:      General: Abdomen is flat. Palpations: Abdomen is soft. Musculoskeletal:      Right lower leg: No edema. Left lower leg: No edema. Skin:     General: Skin is warm and dry. Neurological:      Mental Status: She is alert.        Lab Results   Component Value Date    WBC 5.0 04/28/2022    HGB 12.2 04/28/2022    HCT 37.1 04/28/2022    MCV 86.4 04/28/2022     04/28/2022     Lab Results   Component Value Date     04/28/2022    K 4.1 04/28/2022     04/28/2022    CO2 21 04/28/2022    BUN 12 04/28/2022    CREATININE 0.7 04/28/2022    GLUCOSE 147 (H) 04/28/2022    CALCIUM 9.7 04/28/2022    PROT 8.5 (H) 04/28/2022    LABALBU 4.5 04/28/2022    BILITOT 0.7 04/28/2022    ALKPHOS 108 04/28/2022    AST 10 (L) 04/28/2022    ALT 9 (L) 04/28/2022    LABGLOM >60 04/28/2022    GFRAA >60 04/28/2022    AGRATIO 1.1 04/28/2022     Lab Results   Component Value Date    CHOL 143 04/28/2022    CHOL 147 11/10/2021     Lab Results   Component Value Date    TRIG 80 04/28/2022    TRIG 48 11/10/2021     Lab Results   Component Value Date    HDL 40 04/28/2022    HDL 62 (H) 11/10/2021     Lab Results   Component Value Date    LDLCALC 87 04/28/2022    LDLCALC 75 11/10/2021     Lab Results   Component Value Date    LABVLDL 16 04/28/2022    LABVLDL 10 11/10/2021     Lab Results   Component Value Date    LABA1C 7.5 11/17/2022         Discussed use, benefit, and side effects of prescribed medications. Barriers to medication compliance addressed. All patient questions answered. Pt voiced understanding. RTC No follow-ups on file. No future appointments.       Ariane Funk MD  1/3/2023  1:41 PM

## 2023-01-27 DIAGNOSIS — I10 ESSENTIAL HYPERTENSION: ICD-10-CM

## 2023-01-27 RX ORDER — HYDROCHLOROTHIAZIDE 25 MG/1
TABLET ORAL
Qty: 30 TABLET | Refills: 1 | Status: SHIPPED | OUTPATIENT
Start: 2023-01-27

## 2023-01-27 NOTE — TELEPHONE ENCOUNTER
Medication:   Requested Prescriptions     Pending Prescriptions Disp Refills    hydroCHLOROthiazide (HYDRODIURIL) 25 MG tablet [Pharmacy Med Name: HYDROCHLOROTHIAZIDE 25 MG TAB] 30 tablet 1     Sig: TAKE 1 TABLET BY MOUTH EVERY DAY IN THE MORNING        Last Filled:  1/3/23    Patient Phone Number: [phone] (home)     Last appt: 1/3/2023   Next appt: 1/31/2023    Last OARRS: No flowsheet data found.

## 2023-01-30 ASSESSMENT — ENCOUNTER SYMPTOMS
NAUSEA: 0
DIARRHEA: 0

## 2023-01-30 NOTE — PROGRESS NOTES
Chief Complaint   Patient presents with    Hypertension    Weight Management           ASSESSMENT/PLAN:  1. Essential hypertension  Controlled-continue medication regimen  Follow-up in 4 weeks to gauge improvement and titrate medication accordingly  Will continue to work on weight loss  Rule out sleep apnea in case it is contributing    2. Type 2 diabetes mellitus without complication, without long-term current use of insulin (HCC)  Uncontrolled, last A1c 0.4%  Continue Trulicity on -  increase dose to 3 mg pen   follow-up in 4 weeks to gauge improvement and make sure she tolerates this okay  Discussed side effects  Answered all questions    3. Morbid obesity with BMI of 73-57.5 in adult  Complicating all aspects of patient's care  Increase Trulicity dose  Follow-up in 4 weeks to gauge improvement    Orders Placed This Encounter    Dulaglutide (TRULICITY) 3 KK/6.0HA SOPN     Sig: Inject 3 mg into the skin once a week     Dispense:  4 Adjustable Dose Pre-filled Pen Syringe     Refill:  1         HPI:  Franky Da Silva is a 44 y.o. (: 1985) here today for chronic condition management. Increased HCTZ LOV    Hypertension  Rx: Hydrochlorothiazide 25 mg daily and losartan 50 mg daily   BP Readings from Last 3 Encounters:   23 136/70   23 (!) 172/90   22 (!) 145/90     Lab Results   Component Value Date    CREATININE 0.7 2022       DMT2  Rx: Metformin 2000 mg daily with addition of trulicity 1.5 mg weekly  Checking sugars at home: no  Hemoglobin A1C   Date Value Ref Range Status   2022 7.5 % Final     Lab Results   Component Value Date    LABMICR <1.20 2022     Morbid obesity  Wt Readings from Last 3 Encounters:   23 298 lb (135.2 kg)   23 (!) 311 lb (141.1 kg)   22 (!) 309 lb (140.2 kg)   Working on lifestyle changes and dietary modifications    Review of Systems   Constitutional:  Negative for fatigue and unexpected weight change.    Eyes:  Negative for visual disturbance. Cardiovascular:  Negative for chest pain and leg swelling. Gastrointestinal:  Negative for diarrhea and nausea. Endocrine: Negative for polydipsia, polyphagia and polyuria. Genitourinary:  Negative for dysuria. Skin:  Negative for wound. Neurological:  Negative for numbness. Past Medical History:   Diagnosis Date    Diabetes mellitus ([facility] [place] [address]     Hypertension     Iron deficiency anemia     Polycystic ovary syndrome        Family History   Problem Relation Age of Onset    High Blood Pressure Mother     Diabetes Mother     High Blood Pressure Father     Colon Cancer Maternal Grandmother        Social History     Tobacco Use    Smoking status: Never    Smokeless tobacco: Never   Vaping Use    Vaping Use: Never used   Substance Use Topics    Alcohol use: Yes     Alcohol/week: 1.0 standard drink     Types: 1 Glasses of wine per week     Comment: 1 EVERY 2 WEEKS    Drug use: Never       New Prescriptions    DULAGLUTIDE (TRULICITY) 3 TJ/9.2KG SOPN    Inject 3 mg into the skin once a week       Meds Prior to visit:  Current Outpatient Medications on File Prior to Visit   Medication Sig Dispense Refill    hydroCHLOROthiazide (HYDRODIURIL) 25 MG tablet TAKE 1 TABLET BY MOUTH EVERY DAY IN THE MORNING 30 tablet 1    metFORMIN (GLUCOPHAGE) 1000 MG tablet Take 1 tablet by mouth 2 times daily (with meals) 180 tablet 3    KIMBERLEE 0.25-35 MG-MCG per tablet TAKE 1 TABLET BY MOUTH EVERY DAY      losartan (COZAAR) 50 MG tablet Take 1 tablet by mouth daily 30 tablet 5    glucose monitoring (FREESTYLE FREEDOM) kit 1 kit by Does not apply route daily 1 kit 0    Lancets MISC 1 each by Does not apply route 3 times daily 200 each 5    blood glucose monitor strips Test 3 times a day & as needed for symptoms of irregular blood glucose. Dispense sufficient amount for indicated testing frequency plus additional to accommodate PRN testing needs.  200 strip 5     No current facility-administered medications on file prior to visit. Allergies   Allergen Reactions    Aspirin Other (See Comments)     Pt states something from childhood that is hereditary in her blood         OBJECTIVE:  /70   Pulse 92   Temp 97 °F (36.1 °C) (Temporal)   Ht 5' 9\" (1.753 m)   Wt 298 lb (135.2 kg)   LMP 12/31/2022 (Approximate)   BMI 44.01 kg/m²   BP Readings from Last 2 Encounters:   01/31/23 136/70   01/03/23 (!) 172/90     Wt Readings from Last 3 Encounters:   01/31/23 298 lb (135.2 kg)   01/03/23 (!) 311 lb (141.1 kg)   11/17/22 (!) 309 lb (140.2 kg)       Physical Exam  Vitals reviewed. Constitutional:       General: She is not in acute distress. Appearance: Normal appearance. She is obese. HENT:      Head: Normocephalic and atraumatic. Eyes:      Extraocular Movements: Extraocular movements intact. Pupils: Pupils are equal, round, and reactive to light. Cardiovascular:      Rate and Rhythm: Normal rate and regular rhythm. Pulses: Normal pulses. Heart sounds: Normal heart sounds. No murmur heard. No gallop. Pulmonary:      Effort: Pulmonary effort is normal.      Breath sounds: Normal breath sounds. No wheezing or rales. Abdominal:      General: Abdomen is flat. Palpations: Abdomen is soft. Musculoskeletal:      Right lower leg: No edema. Left lower leg: No edema. Skin:     General: Skin is warm and dry. Neurological:      Mental Status: She is alert.        Lab Results   Component Value Date    WBC 5.0 04/28/2022    HGB 12.2 04/28/2022    HCT 37.1 04/28/2022    MCV 86.4 04/28/2022     04/28/2022     Lab Results   Component Value Date     04/28/2022    K 4.1 04/28/2022     04/28/2022    CO2 21 04/28/2022    BUN 12 04/28/2022    CREATININE 0.7 04/28/2022    GLUCOSE 147 (H) 04/28/2022    CALCIUM 9.7 04/28/2022    PROT 8.5 (H) 04/28/2022    LABALBU 4.5 04/28/2022    BILITOT 0.7 04/28/2022    ALKPHOS 108 04/28/2022    AST 10 (L) 04/28/2022    ALT 9 (L) 04/28/2022 LABGLOM >60 04/28/2022    GFRAA >60 04/28/2022    AGRATIO 1.1 04/28/2022     Lab Results   Component Value Date    CHOL 143 04/28/2022    CHOL 147 11/10/2021     Lab Results   Component Value Date    TRIG 80 04/28/2022    TRIG 48 11/10/2021     Lab Results   Component Value Date    HDL 40 04/28/2022    HDL 62 (H) 11/10/2021     Lab Results   Component Value Date    LDLCALC 87 04/28/2022    LDLCALC 75 11/10/2021     Lab Results   Component Value Date    LABVLDL 16 04/28/2022    LABVLDL 10 11/10/2021     Lab Results   Component Value Date    LABA1C 7.5 11/17/2022         Discussed use, benefit, and side effects of prescribed medications. Barriers to medication compliance addressed. All patient questions answered. Pt voiced understanding. RTC Return in about 4 weeks (around 2/28/2023).     Future Appointments   Date Time Provider [facility]   3/14/2023 10:00 AM DO LARISSA Álvarez [address]           Gina Coleman MD  1/31/2023  12:46 PM

## 2023-01-31 ENCOUNTER — OFFICE VISIT (OUTPATIENT)
Dept: PRIMARY CARE CLINIC | Age: 38
End: 2023-01-31
Payer: MEDICAID

## 2023-01-31 VITALS
TEMPERATURE: 97 F | HEART RATE: 92 BPM | HEIGHT: 69 IN | BODY MASS INDEX: 43.4 KG/M2 | WEIGHT: 293 LBS | DIASTOLIC BLOOD PRESSURE: 70 MMHG | SYSTOLIC BLOOD PRESSURE: 136 MMHG

## 2023-01-31 DIAGNOSIS — E11.9 TYPE 2 DIABETES MELLITUS WITHOUT COMPLICATION, WITHOUT LONG-TERM CURRENT USE OF INSULIN (HCC): ICD-10-CM

## 2023-01-31 DIAGNOSIS — I10 ESSENTIAL HYPERTENSION: Primary | ICD-10-CM

## 2023-01-31 DIAGNOSIS — E66.01 MORBID OBESITY WITH BMI OF 40.0-44.9, ADULT (HCC): ICD-10-CM

## 2023-01-31 PROCEDURE — 99214 OFFICE O/P EST MOD 30 MIN: CPT | Performed by: FAMILY MEDICINE

## 2023-01-31 PROCEDURE — 3075F SYST BP GE 130 - 139MM HG: CPT | Performed by: FAMILY MEDICINE

## 2023-01-31 PROCEDURE — 3078F DIAST BP <80 MM HG: CPT | Performed by: FAMILY MEDICINE

## 2023-01-31 RX ORDER — DULAGLUTIDE 3 MG/.5ML
3 INJECTION, SOLUTION SUBCUTANEOUS WEEKLY
Qty: 4 ADJUSTABLE DOSE PRE-FILLED PEN SYRINGE | Refills: 1 | Status: SHIPPED | OUTPATIENT
Start: 2023-01-31

## 2023-02-02 ENCOUNTER — TELEPHONE (OUTPATIENT)
Dept: PRIMARY CARE CLINIC | Age: 38
End: 2023-02-02

## 2023-02-02 DIAGNOSIS — E11.9 TYPE 2 DIABETES MELLITUS WITHOUT COMPLICATION, WITHOUT LONG-TERM CURRENT USE OF INSULIN (HCC): Primary | ICD-10-CM

## 2023-02-02 NOTE — TELEPHONE ENCOUNTER
Patient called and said the pharmacy has been out of Trulicity since November. The patient has new insurance so the pharmacy ran the Ozempic (low-dose) and it ws approved and is covered. Patient would like to know if she can get the low-dose of Ozempic since the pharmacy is out of [facility] and they do not know when they will get it back in.

## 2023-02-03 RX ORDER — SEMAGLUTIDE 1.34 MG/ML
0.5 INJECTION, SOLUTION SUBCUTANEOUS WEEKLY
Qty: 1.5 ML | Refills: 1 | Status: SHIPPED | OUTPATIENT
Start: 2023-02-03

## 2023-02-03 NOTE — TELEPHONE ENCOUNTER
Please call: I sent in a prescription for Ozempic to her pharmacy. Ask her to let us know if she has any issues.     Thanks,  Mane Calderon MD

## 2023-02-25 DIAGNOSIS — I10 ESSENTIAL HYPERTENSION: ICD-10-CM

## 2023-02-27 RX ORDER — HYDROCHLOROTHIAZIDE 25 MG/1
TABLET ORAL
Qty: 30 TABLET | Refills: 1 | Status: SHIPPED | OUTPATIENT
Start: 2023-02-27

## 2023-02-27 NOTE — TELEPHONE ENCOUNTER
Medication:   Requested Prescriptions     Pending Prescriptions Disp Refills    hydroCHLOROthiazide (HYDRODIURIL) 25 MG tablet [Pharmacy Med Name: HYDROCHLOROTHIAZIDE 25 MG TAB] 30 tablet 1     Sig: TAKE 1 TABLET BY MOUTH EVERY DAY IN THE MORNING        Last Filled:  01/27/23    Patient Phone Number: [phone] (home)     Last appt: 1/31/2023   Next appt: 3/14/2023    Last OARRS: No flowsheet data found.

## 2023-03-12 DIAGNOSIS — I10 ESSENTIAL HYPERTENSION: ICD-10-CM

## 2023-03-13 RX ORDER — LOSARTAN POTASSIUM 50 MG/1
TABLET ORAL
Qty: 90 TABLET | Refills: 1 | Status: SHIPPED | OUTPATIENT
Start: 2023-03-13

## 2023-03-13 SDOH — ECONOMIC STABILITY: INCOME INSECURITY: HOW HARD IS IT FOR YOU TO PAY FOR THE VERY BASICS LIKE FOOD, HOUSING, MEDICAL CARE, AND HEATING?: NOT HARD AT ALL

## 2023-03-13 SDOH — ECONOMIC STABILITY: FOOD INSECURITY: WITHIN THE PAST 12 MONTHS, THE FOOD YOU BOUGHT JUST DIDN'T LAST AND YOU DIDN'T HAVE MONEY TO GET MORE.: NEVER TRUE

## 2023-03-13 SDOH — ECONOMIC STABILITY: FOOD INSECURITY: WITHIN THE PAST 12 MONTHS, YOU WORRIED THAT YOUR FOOD WOULD RUN OUT BEFORE YOU GOT MONEY TO BUY MORE.: NEVER TRUE

## 2023-03-13 SDOH — ECONOMIC STABILITY: HOUSING INSECURITY
IN THE LAST 12 MONTHS, WAS THERE A TIME WHEN YOU DID NOT HAVE A STEADY PLACE TO SLEEP OR SLEPT IN A SHELTER (INCLUDING NOW)?: NO

## 2023-03-13 NOTE — TELEPHONE ENCOUNTER
Medication:   Requested Prescriptions     Pending Prescriptions Disp Refills    losartan (COZAAR) 50 MG tablet [Pharmacy Med Name: LOSARTAN POTASSIUM 50 MG TAB] 90 tablet 1     Sig: TAKE 1 TABLET BY MOUTH EVERY DAY        Last Filled:  11/17/2022     Patient Phone Number: [phone] (home)     Last appt: 1/31/2023   Next appt: 3/14/2023    Last OARRS: No flowsheet data found. RTC Return in about 4 weeks (around 2/28/2023).

## 2023-03-14 ENCOUNTER — OFFICE VISIT (OUTPATIENT)
Dept: PRIMARY CARE CLINIC | Age: 38
End: 2023-03-14
Payer: MEDICAID

## 2023-03-14 VITALS
HEIGHT: 69 IN | HEART RATE: 98 BPM | BODY MASS INDEX: 43.4 KG/M2 | SYSTOLIC BLOOD PRESSURE: 139 MMHG | DIASTOLIC BLOOD PRESSURE: 88 MMHG | WEIGHT: 293 LBS | TEMPERATURE: 97 F

## 2023-03-14 DIAGNOSIS — E66.01 CLASS 3 SEVERE OBESITY DUE TO EXCESS CALORIES WITH SERIOUS COMORBIDITY AND BODY MASS INDEX (BMI) OF 50.0 TO 59.9 IN ADULT (HCC): ICD-10-CM

## 2023-03-14 DIAGNOSIS — E11.59 HYPERTENSION ASSOCIATED WITH DIABETES (HCC): ICD-10-CM

## 2023-03-14 DIAGNOSIS — I15.2 HYPERTENSION ASSOCIATED WITH DIABETES (HCC): ICD-10-CM

## 2023-03-14 DIAGNOSIS — E11.9 TYPE 2 DIABETES MELLITUS WITHOUT COMPLICATION, WITHOUT LONG-TERM CURRENT USE OF INSULIN (HCC): Primary | ICD-10-CM

## 2023-03-14 LAB — HBA1C MFR BLD: 6.5 %

## 2023-03-14 PROCEDURE — 83036 HEMOGLOBIN GLYCOSYLATED A1C: CPT | Performed by: STUDENT IN AN ORGANIZED HEALTH CARE EDUCATION/TRAINING PROGRAM

## 2023-03-14 PROCEDURE — 3075F SYST BP GE 130 - 139MM HG: CPT | Performed by: STUDENT IN AN ORGANIZED HEALTH CARE EDUCATION/TRAINING PROGRAM

## 2023-03-14 PROCEDURE — 3044F HG A1C LEVEL LT 7.0%: CPT | Performed by: STUDENT IN AN ORGANIZED HEALTH CARE EDUCATION/TRAINING PROGRAM

## 2023-03-14 PROCEDURE — 99214 OFFICE O/P EST MOD 30 MIN: CPT | Performed by: STUDENT IN AN ORGANIZED HEALTH CARE EDUCATION/TRAINING PROGRAM

## 2023-03-14 PROCEDURE — G8427 DOCREV CUR MEDS BY ELIG CLIN: HCPCS | Performed by: STUDENT IN AN ORGANIZED HEALTH CARE EDUCATION/TRAINING PROGRAM

## 2023-03-14 PROCEDURE — G8482 FLU IMMUNIZE ORDER/ADMIN: HCPCS | Performed by: STUDENT IN AN ORGANIZED HEALTH CARE EDUCATION/TRAINING PROGRAM

## 2023-03-14 PROCEDURE — G8417 CALC BMI ABV UP PARAM F/U: HCPCS | Performed by: STUDENT IN AN ORGANIZED HEALTH CARE EDUCATION/TRAINING PROGRAM

## 2023-03-14 PROCEDURE — 2022F DILAT RTA XM EVC RTNOPTHY: CPT | Performed by: STUDENT IN AN ORGANIZED HEALTH CARE EDUCATION/TRAINING PROGRAM

## 2023-03-14 PROCEDURE — 3079F DIAST BP 80-89 MM HG: CPT | Performed by: STUDENT IN AN ORGANIZED HEALTH CARE EDUCATION/TRAINING PROGRAM

## 2023-03-14 PROCEDURE — 1036F TOBACCO NON-USER: CPT | Performed by: STUDENT IN AN ORGANIZED HEALTH CARE EDUCATION/TRAINING PROGRAM

## 2023-03-14 ASSESSMENT — ENCOUNTER SYMPTOMS
SHORTNESS OF BREATH: 0
WHEEZING: 0
NAUSEA: 0

## 2023-03-14 NOTE — PROGRESS NOTES
[facility]  3/14/2023    Louis Hinton (:  1985) is a 44 y.o. female, here for evaluation of the following medical concerns:    Chief Complaint   Patient presents with    Hypertension    Weight Management    Diabetes        ASSESSMENT/ PLAN  1. Type 2 diabetes mellitus without complication, without long-term current use of insulin (HCC)  Controlled, A1c 6.5 today. Continue Ozempic, metformin. Due for eye exam, hep B vaccine. Congratulated the patient on continued weight loss. - POCT glycosylated hemoglobin (Hb A1C)    2. Hypertension associated with diabetes ([address]  Controlled, continue hydrochlorothiazide and losartan. 3. Class 3 severe obesity due to excess calories with serious comorbidity and body mass index (BMI) of 50.0 to 59.9 in [facility])  Complicates all aspects of patient's care, follow-up with bariatric surgery as scheduled       Return in about 3 months (around 2023) for blood pressure follow-up, diabetes follow up, annual physical.    HPI  Patient presents today for follow-up on hypertension, diabetes, obesity. Due to medication shortage, she was switched to Ozempic from [address] [facility]. Notes some GI disturbance, but is otherwise tolerating well. She would like to continue at the current dosage. She has increased her exercise, and is looking forward to her appointment with Dr. Cheyenne Robbins to discuss bariatric surgery. She is taking hydrochlorothiazide and losartan daily as prescribed for her blood pressure. ROS  Review of Systems   Constitutional:  Negative for activity change and unexpected weight change. HENT:  Negative for tinnitus. Eyes:  Negative for visual disturbance. Respiratory:  Negative for shortness of breath and wheezing. Cardiovascular:  Negative for chest pain, palpitations and leg swelling. Gastrointestinal:  Negative for nausea. Genitourinary:  Negative for decreased urine volume.    Neurological:  Negative for syncope, light-headedness and headaches. HISTORIES  Current Outpatient Medications on File Prior to Visit   Medication Sig Dispense Refill    losartan (COZAAR) 50 MG tablet TAKE 1 TABLET BY MOUTH EVERY DAY 90 tablet 1    hydroCHLOROthiazide (HYDRODIURIL) 25 MG tablet TAKE 1 TABLET BY MOUTH EVERY DAY IN THE MORNING 30 tablet 1    Semaglutide,0.25 or 0.5MG/DOS, (OZEMPIC, 0.25 OR 0.5 MG/DOSE,) 2 MG/1.5ML SOPN Inject 0.5 mg into the skin once a week 1.5 mL 1    metFORMIN (GLUCOPHAGE) 1000 MG tablet Take 1 tablet by mouth 2 times daily (with meals) 180 tablet 3    KIMBERLEE 0.25-35 MG-MCG per tablet TAKE 1 TABLET BY MOUTH EVERY DAY      glucose monitoring (FREESTYLE FREEDOM) kit 1 kit by Does not apply route daily 1 kit 0    Lancets MISC 1 each by Does not apply route 3 times daily 200 each 5    blood glucose monitor strips Test 3 times a day & as needed for symptoms of irregular blood glucose. Dispense sufficient amount for indicated testing frequency plus additional to accommodate PRN testing needs. 200 strip 5     No current facility-administered medications on file prior to visit.       Past Medical History:   Diagnosis Date    Diabetes mellitus ([address]     Hypertension     Iron deficiency anemia     Polycystic ovary syndrome      Patient Active Problem List   Diagnosis    Essential hypertension    Type 2 diabetes mellitus without complication, without long-term current use of insulin ([facility])    Menorrhagia with irregular cycle    PCOS (polycystic ovarian syndrome)    Class 3 severe obesity due to excess calories with serious comorbidity and body mass index (BMI) of 50.0 to 59.9 in adult [facility])    Hypertension associated with diabetes ([facility] [address]    Vitamin D deficiency    Absolute anemia       PE  Vitals:    03/14/23 1000 03/14/23 1015   BP: (!) 159/93 139/88   Pulse: (!) 107 98   Temp: 97 °F (36.1 °C)    TempSrc: Temporal    Weight: 294 lb (133.4 kg)    Height: 5' 9\" (1.753 m)      Estimated body mass index is 43.42 kg/m² as calculated from the following: Height as of this encounter: 5' 9\" (1.753 m). Weight as of this encounter: 294 lb (133.4 kg). Physical Exam  Vitals reviewed. Constitutional:       Appearance: Normal appearance. She is obese. HENT:      Head: Normocephalic and atraumatic. Right Ear: Tympanic membrane normal.      Left Ear: Tympanic membrane normal.      Nose: Nose normal.      Mouth/Throat:      Mouth: Mucous membranes are moist.      Pharynx: Oropharynx is clear. Eyes:      Conjunctiva/sclera: Conjunctivae normal.      Pupils: Pupils are equal, round, and reactive to light. Cardiovascular:      Rate and Rhythm: Normal rate and regular rhythm. Pulses: Normal pulses. Heart sounds: Normal heart sounds. Pulmonary:      Effort: Pulmonary effort is normal.      Breath sounds: Normal breath sounds. Abdominal:      General: Abdomen is flat. Palpations: Abdomen is soft. Musculoskeletal:         General: Normal range of motion. Cervical back: Normal range of motion and neck supple. Skin:     General: Skin is warm. Neurological:      Mental Status: She is alert. Psychiatric:         Mood and Affect: Mood normal.         Behavior: Behavior normal.       Shama Ends, DO    This dictation was generated by voice recognition computer software. Although all attempts are made to edit the dictation for accuracy, there may be errors in the transcription that are not intended.

## 2023-03-30 DIAGNOSIS — I10 ESSENTIAL HYPERTENSION: ICD-10-CM

## 2023-03-30 RX ORDER — HYDROCHLOROTHIAZIDE 25 MG/1
TABLET ORAL
Qty: 30 TABLET | Refills: 2 | Status: SHIPPED | OUTPATIENT
Start: 2023-03-30

## 2023-03-30 NOTE — TELEPHONE ENCOUNTER
Medication:   Requested Prescriptions     Pending Prescriptions Disp Refills    hydroCHLOROthiazide (HYDRODIURIL) 25 MG tablet [Pharmacy Med Name: HYDROCHLOROTHIAZIDE 25 MG TAB] 30 tablet 1     Sig: TAKE 1 TABLET BY MOUTH EVERY DAY IN THE MORNING        Last Filled:  2/27/2023     Patient Phone Number: [phone] (home)     Last appt: 3/14/2023   Next appt: 6/27/2023        Return in about 3 months (around 6/14/2023) for blood pressure follow-up, diabetes follow up, annual physical.

## 2023-04-19 ENCOUNTER — TELEPHONE (OUTPATIENT)
Dept: BARIATRICS/WEIGHT MGMT | Age: 38
End: 2023-04-19

## 2023-04-19 NOTE — TELEPHONE ENCOUNTER
Called as a new pt courtesy call - left message. Told patient to have new pt paperwork completely filled out, insurance card, and id and to arrive on time at [facility] location. If they didn't have the paperwork filled out and arrive on time may be rescheduled. Also stated if they didn't receive paperwork to let us know so we could get it to them another way. Left office number on message.

## 2023-04-20 ENCOUNTER — OFFICE VISIT (OUTPATIENT)
Dept: BARIATRICS/WEIGHT MGMT | Age: 38
End: 2023-04-20
Payer: MEDICAID

## 2023-04-20 VITALS
SYSTOLIC BLOOD PRESSURE: 138 MMHG | BODY MASS INDEX: 43.25 KG/M2 | DIASTOLIC BLOOD PRESSURE: 78 MMHG | HEART RATE: 78 BPM | WEIGHT: 292 LBS | HEIGHT: 69 IN

## 2023-04-20 DIAGNOSIS — E66.01 MORBID OBESITY WITH BMI OF 40.0-44.9, ADULT (HCC): Primary | ICD-10-CM

## 2023-04-20 DIAGNOSIS — I10 ESSENTIAL HYPERTENSION: ICD-10-CM

## 2023-04-20 DIAGNOSIS — E11.9 TYPE 2 DIABETES MELLITUS WITHOUT COMPLICATION, WITHOUT LONG-TERM CURRENT USE OF INSULIN (HCC): ICD-10-CM

## 2023-04-20 DIAGNOSIS — E28.2 PCOS (POLYCYSTIC OVARIAN SYNDROME): ICD-10-CM

## 2023-04-20 PROCEDURE — 2022F DILAT RTA XM EVC RTNOPTHY: CPT | Performed by: SURGERY

## 2023-04-20 PROCEDURE — 3044F HG A1C LEVEL LT 7.0%: CPT | Performed by: SURGERY

## 2023-04-20 PROCEDURE — 3075F SYST BP GE 130 - 139MM HG: CPT | Performed by: SURGERY

## 2023-04-20 PROCEDURE — 99205 OFFICE O/P NEW HI 60 MIN: CPT | Performed by: SURGERY

## 2023-04-20 PROCEDURE — 1036F TOBACCO NON-USER: CPT | Performed by: SURGERY

## 2023-04-20 PROCEDURE — G8417 CALC BMI ABV UP PARAM F/U: HCPCS | Performed by: SURGERY

## 2023-04-20 PROCEDURE — 3078F DIAST BP <80 MM HG: CPT | Performed by: SURGERY

## 2023-04-20 PROCEDURE — G8427 DOCREV CUR MEDS BY ELIG CLIN: HCPCS | Performed by: SURGERY

## 2023-04-20 NOTE — PROGRESS NOTES
[facility]) Physicians   General & Laparoscopic Surgery  [employer]      HPI:    Griselda Marquis is a very pleasant 44 y.o. obese female ,   Body mass index is 43.12 kg/m². And multiple medical problems who is presenting for weight loss surgery evaluation and consultation by Dr. Gabo Hale. Patient has been struggling for several years now with obesity. Patient feels the weight is an obstacle to achieve and perform things in daily living as well risk on health. Tries to diet, and exercise but can't keep the weight off. Patient tried other regimens, but with no sustainable weight loss. Patient  is very determined to lose weight and be healthy, and is interested in surgical weight loss to achieve this goal.    Otherwise patient denies any nausea, vomiting, fevers, chills, shortness of breath, chest pain, constipation or urinary symptoms. Morbid obesity and co-morbidities related to it are a threat to bodily function    Past Medical History:   Diagnosis Date    Diabetes mellitus ([address]     Hypertension, essential     Iron deficiency anemia     Morbid obesity with BMI of 40.0-44.9, adult (HCC)     Polycystic ovary syndrome     Pre-operative clearance      Past Surgical History:   Procedure Laterality Date     SECTION      SHOULDER SURGERY Right      Family History   Problem Relation Age of Onset    Hypertension Mother     High Blood Pressure Mother     Diabetes Mother     Elevated Lipids Father     Hypertension Father     High Blood Pressure Father     Colon Cancer Maternal Grandmother      Social History     Tobacco Use    Smoking status: Never    Smokeless tobacco: Never   Substance Use Topics    Alcohol use: Yes     Alcohol/week: 1.0 standard drink     Types: 1 Glasses of wine per week     Comment: 1 EVERY 2 WEEKS     I counseled the patient on the importance of not smoking and risks of ETOH.    Allergies   Allergen Reactions    Aspirin Other (See Comments)     Pt states
proceed. Goals  Weight: 199 lb or lower  Health Improvement: BP / DM / PCOS    Assessment  Nutritional Needs: RMR=(9.99 x 133) + (6.25 x 175) - (4.92 x 37 y.o.) -161 = 2075 kcal x 1.3 (sedentary to moderate activity factor)= 2698 kcal - 1000 (for 2 lb weight loss/week)= 1698 kcal.  Walking 1-3x/wk    Plan  Plan/Recommendations: Start presurgical guidelines. Goals:   -Eat 4-5 times daily  -Avoid high fat and high sugar foods  -Include protein with all meals and snacks  -Avoid carbonation and caffeine  -Avoid calorie containing beverages  -Increase physical activity as tolerated    PES Statement:  Overweight/Obesity related to lack of exercise, sedentary lifestyle, unhealthy eating habits, and unsuccessful diet attempts as evidenced by BMI. Body mass index is 43.12 kg/m². Will follow up as necessary.     Marlena Cervantes RD, LD

## 2023-05-08 ENCOUNTER — TELEPHONE (OUTPATIENT)
Dept: BARIATRICS/WEIGHT MGMT | Age: 38
End: 2023-05-08

## 2023-05-09 ENCOUNTER — INITIAL CONSULT (OUTPATIENT)
Dept: BARIATRICS/WEIGHT MGMT | Age: 38
End: 2023-05-09

## 2023-05-09 ENCOUNTER — TELEPHONE (OUTPATIENT)
Dept: BARIATRICS/WEIGHT MGMT | Age: 38
End: 2023-05-09

## 2023-05-09 DIAGNOSIS — F43.22 ADJUSTMENT REACTION WITH ANXIETY: Primary | ICD-10-CM

## 2023-05-09 DIAGNOSIS — E66.01 MORBID OBESITY WITH BMI OF 40.0-44.9, ADULT (HCC): ICD-10-CM

## 2023-05-09 NOTE — TELEPHONE ENCOUNTER
Patient was seen in [facility] by Dr. Saira Albrecht today. Had question about the June egd schedule. Wanted to know next avail time? Please call and possibly schedule if patient is able.

## 2023-05-09 NOTE — TELEPHONE ENCOUNTER
Left message for pt that the next date is 6/12 and next avail time is currently an arrival of 7:45. Pt may call me back to schedule at her convenience or wait until her 2nd pre-surg visit with Dr. James Mandel on 5/15.

## 2023-05-11 DIAGNOSIS — E28.2 PCOS (POLYCYSTIC OVARIAN SYNDROME): ICD-10-CM

## 2023-05-11 DIAGNOSIS — E66.01 MORBID OBESITY WITH BMI OF 40.0-44.9, ADULT (HCC): ICD-10-CM

## 2023-05-11 DIAGNOSIS — I10 ESSENTIAL HYPERTENSION: ICD-10-CM

## 2023-05-11 DIAGNOSIS — E11.9 TYPE 2 DIABETES MELLITUS WITHOUT COMPLICATION, WITHOUT LONG-TERM CURRENT USE OF INSULIN (HCC): ICD-10-CM

## 2023-05-11 LAB
25(OH)D3 SERPL-MCNC: 21.8 NG/ML
ALBUMIN SERPL-MCNC: 4.2 G/DL (ref 3.4–5)
ALBUMIN/GLOB SERPL: 1.1 {RATIO} (ref 1.1–2.2)
ALP SERPL-CCNC: 83 U/L (ref 40–129)
ALT SERPL-CCNC: 10 U/L (ref 10–40)
ANION GAP SERPL CALCULATED.3IONS-SCNC: 13 MMOL/L (ref 3–16)
AST SERPL-CCNC: 9 U/L (ref 15–37)
BASOPHILS # BLD: 0 K/UL (ref 0–0.2)
BASOPHILS NFR BLD: 0.3 %
BILIRUB SERPL-MCNC: 0.4 MG/DL (ref 0–1)
BUN SERPL-MCNC: 10 MG/DL (ref 7–20)
CALCIUM SERPL-MCNC: 9.4 MG/DL (ref 8.3–10.6)
CHLORIDE SERPL-SCNC: 102 MMOL/L (ref 99–110)
CHOLEST SERPL-MCNC: 152 MG/DL (ref 0–199)
CO2 SERPL-SCNC: 25 MMOL/L (ref 21–32)
CREAT SERPL-MCNC: 0.8 MG/DL (ref 0.6–1.1)
DEPRECATED RDW RBC AUTO: 12.9 % (ref 12.4–15.4)
EOSINOPHIL # BLD: 0.5 K/UL (ref 0–0.6)
EOSINOPHIL NFR BLD: 7.1 %
GFR SERPLBLD CREATININE-BSD FMLA CKD-EPI: >60 ML/MIN/{1.73_M2}
GLUCOSE SERPL-MCNC: 146 MG/DL (ref 70–99)
HCT VFR BLD AUTO: 34.9 % (ref 36–48)
HDLC SERPL-MCNC: 55 MG/DL (ref 40–60)
HGB BLD-MCNC: 11.5 G/DL (ref 12–16)
IRON SATN MFR SERPL: 11 % (ref 15–50)
IRON SERPL-MCNC: 47 UG/DL (ref 37–145)
LDLC SERPL CALC-MCNC: 73 MG/DL
LYMPHOCYTES # BLD: 1.9 K/UL (ref 1–5.1)
LYMPHOCYTES NFR BLD: 26.6 %
MCH RBC QN AUTO: 27.9 PG (ref 26–34)
MCHC RBC AUTO-ENTMCNC: 33 G/DL (ref 31–36)
MCV RBC AUTO: 84.7 FL (ref 80–100)
MONOCYTES # BLD: 0.4 K/UL (ref 0–1.3)
MONOCYTES NFR BLD: 5.9 %
NEUTROPHILS # BLD: 4.2 K/UL (ref 1.7–7.7)
NEUTROPHILS NFR BLD: 60.1 %
PLATELET # BLD AUTO: 323 K/UL (ref 135–450)
PMV BLD AUTO: 10.1 FL (ref 5–10.5)
POTASSIUM SERPL-SCNC: 4.1 MMOL/L (ref 3.5–5.1)
PROT SERPL-MCNC: 8 G/DL (ref 6.4–8.2)
RBC # BLD AUTO: 4.12 M/UL (ref 4–5.2)
SODIUM SERPL-SCNC: 140 MMOL/L (ref 136–145)
TIBC SERPL-MCNC: 424 UG/DL (ref 260–445)
TRIGL SERPL-MCNC: 119 MG/DL (ref 0–150)
TSH SERPL DL<=0.005 MIU/L-ACNC: 1.49 UIU/ML (ref 0.27–4.2)
VIT B12 SERPL-MCNC: 334 PG/ML (ref 211–911)
VLDLC SERPL CALC-MCNC: 24 MG/DL
WBC # BLD AUTO: 7.1 K/UL (ref 4–11)

## 2023-05-15 ENCOUNTER — OFFICE VISIT (OUTPATIENT)
Dept: BARIATRICS/WEIGHT MGMT | Age: 38
End: 2023-05-15
Payer: MEDICAID

## 2023-05-15 VITALS — BODY MASS INDEX: 43.1 KG/M2 | HEIGHT: 69 IN | WEIGHT: 291 LBS

## 2023-05-15 DIAGNOSIS — I10 ESSENTIAL HYPERTENSION: ICD-10-CM

## 2023-05-15 DIAGNOSIS — E66.01 MORBID OBESITY WITH BMI OF 40.0-44.9, ADULT (HCC): Primary | ICD-10-CM

## 2023-05-15 DIAGNOSIS — E28.2 PCOS (POLYCYSTIC OVARIAN SYNDROME): ICD-10-CM

## 2023-05-15 DIAGNOSIS — E55.9 VITAMIN D DEFICIENCY: Primary | ICD-10-CM

## 2023-05-15 DIAGNOSIS — E11.9 TYPE 2 DIABETES MELLITUS WITHOUT COMPLICATION, WITHOUT LONG-TERM CURRENT USE OF INSULIN (HCC): ICD-10-CM

## 2023-05-15 PROCEDURE — 3044F HG A1C LEVEL LT 7.0%: CPT | Performed by: SURGERY

## 2023-05-15 PROCEDURE — G8427 DOCREV CUR MEDS BY ELIG CLIN: HCPCS | Performed by: SURGERY

## 2023-05-15 PROCEDURE — 99214 OFFICE O/P EST MOD 30 MIN: CPT | Performed by: SURGERY

## 2023-05-15 PROCEDURE — G8417 CALC BMI ABV UP PARAM F/U: HCPCS | Performed by: SURGERY

## 2023-05-15 PROCEDURE — 1036F TOBACCO NON-USER: CPT | Performed by: SURGERY

## 2023-05-15 PROCEDURE — 2022F DILAT RTA XM EVC RTNOPTHY: CPT | Performed by: SURGERY

## 2023-05-15 RX ORDER — ERGOCALCIFEROL 1.25 MG/1
50000 CAPSULE ORAL WEEKLY
Qty: 4 CAPSULE | Refills: 5 | Status: SHIPPED | OUTPATIENT
Start: 2023-05-15

## 2023-05-15 NOTE — PROGRESS NOTES
[facility]) Physicians   General & Laparoscopic Surgery  Weight Management Solutions       HPI:     Felisha Brown is a very pleasant 44 y.o. female with Body mass index is 42.97 kg/m². , Pre-Surgery. Pre-operative clearance and work up pending. Working hard to keep good dietary habits as well level of activity. Patient denies any nausea, vomiting, fevers, chills, shortness of breath, chest pain, cough, constipation or difficulty urinating. Past Medical History:   Diagnosis Date    Diabetes mellitus ([address]     Hypertension, essential     Iron deficiency anemia     Morbid obesity with BMI of 40.0-44.9, adult (HCC)     Polycystic ovary syndrome     Pre-operative clearance      Past Surgical History:   Procedure Laterality Date     SECTION      SHOULDER SURGERY Right      Family History   Problem Relation Age of Onset    Hypertension Mother     High Blood Pressure Mother     Diabetes Mother     Elevated Lipids Father     Hypertension Father     High Blood Pressure Father     Colon Cancer Maternal Grandmother      Social History     Tobacco Use    Smoking status: Never    Smokeless tobacco: Never   Substance Use Topics    Alcohol use: Yes     Alcohol/week: 1.0 standard drink     Types: 1 Glasses of wine per week     Comment: 1 EVERY 2 WEEKS     I counseled the patient on the importance of not smoking and risks of ETOH. Allergies   Allergen Reactions    Aspirin Other (See Comments)     Pt states something from childhood that is hereditary in her blood       Vitals:    05/15/23 0704   Weight: 291 lb (132 kg)   Height: 5' 9\" (1.753 m)       Body mass index is 42.97 kg/m².       Current Outpatient Medications:     Probiotic Product (PROBIOTIC GUMMIES PO), Take by mouth, Disp: , Rfl:     hydroCHLOROthiazide (HYDRODIURIL) 25 MG tablet, TAKE 1 TABLET BY MOUTH EVERY DAY IN THE MORNING, Disp: 30 tablet, Rfl: 2    losartan (COZAAR) 50 MG tablet, TAKE 1 TABLET BY MOUTH EVERY DAY, Disp: 90 tablet, Rfl: 1

## 2023-05-15 NOTE — PATIENT INSTRUCTIONS
Patient received dietary handouts and education.      Plan/Recommendations:   - Focus on lean protein 4x/day  - Try protein powder  - Complete Support Group

## 2023-05-15 NOTE — PROGRESS NOTES
Tiago Palacio lost 1 lbs over past ~month. Is pt eating at least 4 times everyday? yes    11a-12p- protein shake & sometimes fruit  2p- fruit  4p- roast w/ lima beans & sweet potato  6-7p- apples    Is pt eating a lean protein source with all meals and snacks? no    Has pt decreased their portions using the plate method?  yes    Is pt choosing low fat/sugar free options? yes    Is pt drinking at least 64 oz of clear liquids everyday? yes - water / sf flavor pkts    Has pt stopped drinking carbonation, caffeinated, and sugar sweetened beverages? yes    Has pt sampled Unjury and/or Nectar protein? discussed, to try    Has pt attended a support group?  Scheduled for 5/22/23    Participating in intentional exercise? yes - walking w/ her daughter 3x/wk    Plan/Recommendations:   - Focus on lean protein 4x/day  - Try protein powder  - Complete Support Group    Handouts: none    Bayron Macias, RD, LD

## 2023-05-26 DIAGNOSIS — E11.9 TYPE 2 DIABETES MELLITUS WITHOUT COMPLICATION, WITHOUT LONG-TERM CURRENT USE OF INSULIN (HCC): ICD-10-CM

## 2023-05-26 RX ORDER — SEMAGLUTIDE 1.34 MG/ML
0.5 INJECTION, SOLUTION SUBCUTANEOUS WEEKLY
Qty: 1.5 ML | Refills: 1 | Status: SHIPPED | OUTPATIENT
Start: 2023-05-26

## 2023-05-26 NOTE — TELEPHONE ENCOUNTER
Medication:   Requested Prescriptions     Pending Prescriptions Disp Refills    Semaglutide,0.25 or 0.5MG/DOS, (OZEMPIC, 0.25 OR 0.5 MG/DOSE,) 2 MG/1.5ML SOPN 1.5 mL 1     Sig: Inject 0.5 mg into the skin once a week        Last Filled:  02/3/23    Patient Phone Number: [phone] (home)     Last appt: 3/14/2023   Next appt: 6/27/2023    Last OARRS: No flowsheet data found.

## 2023-05-30 DIAGNOSIS — E11.9 TYPE 2 DIABETES MELLITUS WITHOUT COMPLICATION, WITHOUT LONG-TERM CURRENT USE OF INSULIN (HCC): ICD-10-CM

## 2023-05-30 RX ORDER — SEMAGLUTIDE 1.34 MG/ML
2 INJECTION, SOLUTION SUBCUTANEOUS WEEKLY
Qty: 3 ML | Refills: 1 | Status: SHIPPED | OUTPATIENT
Start: 2023-05-30

## 2023-05-30 NOTE — PROGRESS NOTES
Medication:   Requested Prescriptions     Pending Prescriptions Disp Refills    Semaglutide,0.25 or 0.5MG/DOS, (OZEMPIC, 0.25 OR 0.5 MG/DOSE,) 2 MG/1.5ML SOPN 3 mL 1     Sig: Inject 2 mg into the skin once a week        Last Filled:  Needs changed per pharmacy to 2mg/3mL    Patient Phone Number: [phone] (home)     Last appt: 3/14/2023   Next appt: 6/27/2023    Last OARRS: No flowsheet data found.

## 2023-05-31 ENCOUNTER — TELEPHONE (OUTPATIENT)
Dept: PRIMARY CARE CLINIC | Age: 38
End: 2023-05-31

## 2023-05-31 NOTE — TELEPHONE ENCOUNTER
Semaglutide,0.25 or 0.5MG/DOS, (OZEMPIC, 0.25 OR 0.5 MG/DOSE,) 2 MG/1.5ML SOPN [[id number]]     Order Details  Dose: 2 mg Route: SubCUTAneous Frequency: WEEKLY   Dispense Quantity: 3 mL Refills: 1          Sig: Inject 2 mg into the skin once a week         Start Date: 05/30/23 End Date: --   Written Date: 05/30/23 Expiration Date: 05/29/24       Diagnosis Association: Type 2 diabetes mellitus without complication, without long-term current use of insulin (HCC) (E11.9)   Original Order:  Semaglutide,0.25 or 0.5MG/DOS, (OZEMPIC, 0.25 OR 0.5 MG/DOSE,) 2 MG/1.5ML SOPN [[id number]]   Providers    Authorizing Provider: Mihai Buchanan DO NPI: [id number]   Ordering User:  Mihai Buchanan DO          Pharmacy    [facility]/pharmacy [facility] [address], [facility] [address]   [address] [id number]   Phone:  [phone]  Fax:  [phone]     Order Class:    Order Class   Normal [1]     Destination    This Order   E-PRESCRIBING INTERFACE [[id number]]     Action Summary    Action User: --            Warnings Override History    No Interaction Warnings Shown      Semaglutide,0.25 or 0.5MG/DOS, (OZEMPIC, 0.25 OR 0.5 MG/DOSE,) 2 MG/1.5ML SOPN  Date: 5/30/2023 Department:  [facility] Ordering/Authorizing: Mihai Buchanan DO     Outpatient Medication Detail     Disp Refills Start End    Semaglutide,0.25 or 0.5MG/DOS, (OZEMPIC, 0.25 OR 0.5 MG/DOSE,) 2 MG/1.5ML SOPN 3 mL 1 5/30/2023     Sig - Route: Inject 2 mg into the skin once a week - SubCUTAneous    Sent to pharmacy as: Ozempic (0.25 or 0.5 MG/DOSE) 2 MG/1.5ML Solution Pen-injector (Semaglutide(0.25 or 0.5MG/DOS))    E-Prescribing Status: Receipt confirmed by pharmacy (5/30/2023  4:21 PM EDT)

## 2023-06-01 NOTE — TELEPHONE ENCOUNTER
Submitted PA for Ozempic (0.25 or 0.5 MG/DOSE) 2MG/1.5ML pen-injectors  Via CMM Key: [id number] STATUS: PENDING. Follow up done daily; if no response in three days we will refax for status check. If another three days goes by with no response we will call the insurance for status.

## 2023-06-09 ENCOUNTER — CLINICAL DOCUMENTATION (OUTPATIENT)
Dept: BARIATRICS/WEIGHT MGMT | Age: 38
End: 2023-06-09

## 2023-06-09 ENCOUNTER — TELEPHONE (OUTPATIENT)
Dept: BARIATRICS/WEIGHT MGMT | Age: 38
End: 2023-06-09

## 2023-06-09 DIAGNOSIS — E55.9 VITAMIN D DEFICIENCY: ICD-10-CM

## 2023-06-09 DIAGNOSIS — E11.9 TYPE 2 DIABETES MELLITUS WITHOUT COMPLICATION, WITHOUT LONG-TERM CURRENT USE OF INSULIN (HCC): ICD-10-CM

## 2023-06-09 RX ORDER — ERGOCALCIFEROL 1.25 MG/1
CAPSULE ORAL
Qty: 4 CAPSULE | Refills: 5 | OUTPATIENT
Start: 2023-06-09

## 2023-06-09 RX ORDER — SEMAGLUTIDE 1.34 MG/ML
0.25 INJECTION, SOLUTION SUBCUTANEOUS WEEKLY
Qty: 3 ML | Refills: 2 | Status: SHIPPED | OUTPATIENT
Start: 2023-06-09

## 2023-06-09 NOTE — TELEPHONE ENCOUNTER
[facility]    7:45 arrival time  NPO 12 hrs prior

## 2023-06-09 NOTE — PROGRESS NOTES
Derrick Jensen lost 1 lbs over past ~ 3 weeks. Pt is fasting from 8 pm. until the next day at 11a-12 pm. daily. This eval was conducted via phone on 6/9/23 in preparation or their pre-surgical visit on 6/15/23 with Dr. Colton Maxwell. Is pt eating at least 4 times everyday? Yes   L - slimfast protein shake with powdered PB and banana   S - fruit sometimes with turkey   D - Baked chicken with peppers/onions and broccoli/cauliflower with brown rice   S - watermelon     Is pt eating a lean protein source with all meals and snacks? Not always for snacks     Has pt decreased their portions using the plate method? Feels good with portions     Is pt choosing low fat/sugar free options? Yes     Is pt drinking at least 64 oz of clear liquids everyday? Yes / around this     Has pt stopped drinking carbonation, caffeinated, and sugar sweetened beverages? Yes - Drinks water with splenda, eliminated coffee     Has pt sampled Unjury and/or Nectar protein? Tried double stuffed cookie and chocolate truffle both made with water and didn't like     Has pt attended a support group? Completed Meal Planning SG 5/22/23     Participating in intentional exercise?  Yes, walking with daughter 3 x week (around 5 times at the mall)     Plan/Recommendations:   Avoid fasting - discussed hurdles it would create post op with limiting fluids/eating occasions/affecting protein intake   Include protein consistently with snacks   Try protein powder / reviewed other liquid options she can prepare shakes with to make them more acceptable     Handouts: none     Shanique Johnson, RD, LD

## 2023-06-15 ENCOUNTER — OFFICE VISIT (OUTPATIENT)
Dept: BARIATRICS/WEIGHT MGMT | Age: 38
End: 2023-06-15
Payer: MEDICAID

## 2023-06-15 VITALS — BODY MASS INDEX: 42.36 KG/M2 | HEIGHT: 69 IN | WEIGHT: 286 LBS

## 2023-06-15 DIAGNOSIS — E66.01 MORBID OBESITY WITH BMI OF 40.0-44.9, ADULT (HCC): Primary | ICD-10-CM

## 2023-06-15 DIAGNOSIS — I10 ESSENTIAL HYPERTENSION: ICD-10-CM

## 2023-06-15 DIAGNOSIS — E11.9 TYPE 2 DIABETES MELLITUS WITHOUT COMPLICATION, WITHOUT LONG-TERM CURRENT USE OF INSULIN (HCC): ICD-10-CM

## 2023-06-15 DIAGNOSIS — E28.2 PCOS (POLYCYSTIC OVARIAN SYNDROME): ICD-10-CM

## 2023-06-15 PROCEDURE — 3044F HG A1C LEVEL LT 7.0%: CPT | Performed by: SURGERY

## 2023-06-15 PROCEDURE — G8427 DOCREV CUR MEDS BY ELIG CLIN: HCPCS | Performed by: SURGERY

## 2023-06-15 PROCEDURE — 99214 OFFICE O/P EST MOD 30 MIN: CPT | Performed by: SURGERY

## 2023-06-15 PROCEDURE — G8417 CALC BMI ABV UP PARAM F/U: HCPCS | Performed by: SURGERY

## 2023-06-15 PROCEDURE — 1036F TOBACCO NON-USER: CPT | Performed by: SURGERY

## 2023-06-15 PROCEDURE — 2022F DILAT RTA XM EVC RTNOPTHY: CPT | Performed by: SURGERY

## 2023-06-25 ASSESSMENT — ENCOUNTER SYMPTOMS
GASTROINTESTINAL NEGATIVE: 1
COUGH: 0
ALLERGIC/IMMUNOLOGIC NEGATIVE: 1
RESPIRATORY NEGATIVE: 1
EYES NEGATIVE: 1
SHORTNESS OF BREATH: 0

## 2023-06-26 DIAGNOSIS — I10 ESSENTIAL HYPERTENSION: ICD-10-CM

## 2023-06-27 RX ORDER — HYDROCHLOROTHIAZIDE 25 MG/1
25 TABLET ORAL EVERY MORNING
Qty: 30 TABLET | Refills: 3 | Status: SHIPPED | OUTPATIENT
Start: 2023-06-27

## 2023-06-27 RX ORDER — LOSARTAN POTASSIUM 50 MG/1
50 TABLET ORAL DAILY
Qty: 90 TABLET | Refills: 3 | Status: SHIPPED | OUTPATIENT
Start: 2023-06-27

## 2023-06-29 RX ORDER — AMOXICILLIN 500 MG/1
1000 CAPSULE ORAL 2 TIMES DAILY
Qty: 56 CAPSULE | Refills: 0 | Status: SHIPPED | OUTPATIENT
Start: 2023-06-29 | End: 2023-07-13

## 2023-07-05 ENCOUNTER — CLINICAL DOCUMENTATION (OUTPATIENT)
Dept: BARIATRICS/WEIGHT MGMT | Age: 38
End: 2023-07-05

## 2023-07-05 NOTE — PROGRESS NOTES
Mackenzie Gonsalez has no new weight to report. This eval was conducted via phone on 7/5/23 in preparation or their pre-surgical visit on 7/6/23 with Dr. Santos Dominguez. Is pt eating at least 4 times everyday? Yes   B- protein shake   L- protein shake with fruit   S - watermelon sometimes with turkey   D - Baked chicken/salmon with peppers/onions and broccoli/cauliflower with brown rice or cauli rice   S - watermelon with chix     Is pt eating a lean protein source with all meals and snacks? Pt has been more consistent with this at snacks since last visit    Has pt decreased their portions using the plate method? Feels good with portions     Is pt choosing low fat/sugar free options? Yes     Is pt drinking at least 64 oz of clear liquids everyday? Yes / around this     Has pt stopped drinking carbonation, caffeinated, and sugar sweetened beverages? Yes - eliminated coffee     Has pt sampled Unjury and/or Nectar protein? Tried double stuffed cookie, lemonade, and chocolate truffle both made with water and didn't like. Unjury brand (root beer flavor and plain) upset her stomach    Has pt attended a support group? Completed Meal Planning SG 5/22/23     Participating in intentional exercise? Yes, walking with daughter 3 x week (around 5 times at the mall)     Plan/Recommendations:   Continue with plan  Recommend limiting protein shakes to 1 per day.    Try additional flavors of nectar protein powder / reviewed other liquid options she can prepare shakes with to make them more acceptable ie decaf iced coffee    Handouts: none     Kash Hairston, MS, RD, LD

## 2023-07-06 ENCOUNTER — TELEPHONE (OUTPATIENT)
Dept: BARIATRICS/WEIGHT MGMT | Age: 38
End: 2023-07-06

## 2023-07-06 ENCOUNTER — OFFICE VISIT (OUTPATIENT)
Dept: BARIATRICS/WEIGHT MGMT | Age: 38
End: 2023-07-06
Payer: MEDICAID

## 2023-07-06 VITALS — BODY MASS INDEX: 42.8 KG/M2 | WEIGHT: 289 LBS | HEIGHT: 69 IN

## 2023-07-06 DIAGNOSIS — E11.9 TYPE 2 DIABETES MELLITUS WITHOUT COMPLICATION, WITHOUT LONG-TERM CURRENT USE OF INSULIN (HCC): ICD-10-CM

## 2023-07-06 DIAGNOSIS — E28.2 PCOS (POLYCYSTIC OVARIAN SYNDROME): ICD-10-CM

## 2023-07-06 DIAGNOSIS — Z01.818 PRE-OPERATIVE CLEARANCE: Primary | ICD-10-CM

## 2023-07-06 DIAGNOSIS — E66.01 MORBID OBESITY WITH BMI OF 40.0-44.9, ADULT (HCC): Primary | ICD-10-CM

## 2023-07-06 DIAGNOSIS — I10 ESSENTIAL HYPERTENSION: ICD-10-CM

## 2023-07-06 PROCEDURE — 99214 OFFICE O/P EST MOD 30 MIN: CPT | Performed by: SURGERY

## 2023-07-06 PROCEDURE — 2022F DILAT RTA XM EVC RTNOPTHY: CPT | Performed by: SURGERY

## 2023-07-06 PROCEDURE — 3044F HG A1C LEVEL LT 7.0%: CPT | Performed by: SURGERY

## 2023-07-06 PROCEDURE — G8417 CALC BMI ABV UP PARAM F/U: HCPCS | Performed by: SURGERY

## 2023-07-06 PROCEDURE — G8427 DOCREV CUR MEDS BY ELIG CLIN: HCPCS | Performed by: SURGERY

## 2023-07-06 PROCEDURE — 1036F TOBACCO NON-USER: CPT | Performed by: SURGERY

## 2023-07-13 ENCOUNTER — TELEPHONE (OUTPATIENT)
Dept: BARIATRICS/WEIGHT MGMT | Age: 38
End: 2023-07-13

## 2023-07-13 NOTE — PROGRESS NOTES
Patient Name:   Crystal Keller       Type of Surgery:  Sleeve         Date of Surgery:  Tuesday August 15th      Start Pre-Op Diet On:  Wednesday August 2nd     Start Clear Liquids On:  Monday August 14th        NPO after midnight the night before your surgery! Take morning medications with a small amount of water.     NOTES:_______________________________________  ______________________________________________

## 2023-07-18 ENCOUNTER — OFFICE VISIT (OUTPATIENT)
Dept: BARIATRICS/WEIGHT MGMT | Age: 38
End: 2023-07-18
Payer: MEDICAID

## 2023-07-18 VITALS — WEIGHT: 292 LBS | HEIGHT: 69 IN | BODY MASS INDEX: 43.25 KG/M2

## 2023-07-18 DIAGNOSIS — I10 ESSENTIAL HYPERTENSION: Primary | ICD-10-CM

## 2023-07-18 DIAGNOSIS — E11.9 TYPE 2 DIABETES MELLITUS WITHOUT COMPLICATION, WITHOUT LONG-TERM CURRENT USE OF INSULIN (HCC): ICD-10-CM

## 2023-07-18 DIAGNOSIS — E66.01 MORBID OBESITY WITH BMI OF 40.0-44.9, ADULT (HCC): ICD-10-CM

## 2023-07-18 PROCEDURE — G8427 DOCREV CUR MEDS BY ELIG CLIN: HCPCS | Performed by: NURSE PRACTITIONER

## 2023-07-18 PROCEDURE — 99214 OFFICE O/P EST MOD 30 MIN: CPT | Performed by: NURSE PRACTITIONER

## 2023-07-18 PROCEDURE — 3044F HG A1C LEVEL LT 7.0%: CPT | Performed by: NURSE PRACTITIONER

## 2023-07-18 PROCEDURE — G8417 CALC BMI ABV UP PARAM F/U: HCPCS | Performed by: NURSE PRACTITIONER

## 2023-07-18 PROCEDURE — 1036F TOBACCO NON-USER: CPT | Performed by: NURSE PRACTITIONER

## 2023-07-18 PROCEDURE — 2022F DILAT RTA XM EVC RTNOPTHY: CPT | Performed by: NURSE PRACTITIONER

## 2023-07-18 RX ORDER — FERROUS SULFATE 325(65) MG
325 TABLET ORAL
COMMUNITY

## 2023-07-18 RX ORDER — METRONIDAZOLE 500 MG/1
1 TABLET ORAL 2 TIMES DAILY
COMMUNITY
Start: 2023-07-12 | End: 2023-07-19

## 2023-07-19 ENCOUNTER — OFFICE VISIT (OUTPATIENT)
Dept: PRIMARY CARE CLINIC | Age: 38
End: 2023-07-19
Payer: MEDICAID

## 2023-07-19 VITALS
TEMPERATURE: 98.6 F | WEIGHT: 289 LBS | SYSTOLIC BLOOD PRESSURE: 136 MMHG | HEIGHT: 69 IN | DIASTOLIC BLOOD PRESSURE: 88 MMHG | HEART RATE: 86 BPM | BODY MASS INDEX: 42.8 KG/M2

## 2023-07-19 DIAGNOSIS — Z01.811 PRE-OP CHEST EXAM: Primary | ICD-10-CM

## 2023-07-19 DIAGNOSIS — E66.01 MORBID OBESITY WITH BMI OF 40.0-44.9, ADULT (HCC): ICD-10-CM

## 2023-07-19 DIAGNOSIS — I10 ESSENTIAL HYPERTENSION: ICD-10-CM

## 2023-07-19 DIAGNOSIS — R94.31 ABNORMAL EKG: ICD-10-CM

## 2023-07-19 DIAGNOSIS — Z01.818 PRE-OPERATIVE CLEARANCE: ICD-10-CM

## 2023-07-19 DIAGNOSIS — E11.9 TYPE 2 DIABETES MELLITUS WITHOUT COMPLICATION, WITHOUT LONG-TERM CURRENT USE OF INSULIN (HCC): ICD-10-CM

## 2023-07-19 LAB
ALBUMIN SERPL-MCNC: 4.1 G/DL (ref 3.4–5)
ALBUMIN/GLOB SERPL: 1.1 {RATIO} (ref 1.1–2.2)
ALP SERPL-CCNC: 83 U/L (ref 40–129)
ALT SERPL-CCNC: 14 U/L (ref 10–40)
ANION GAP SERPL CALCULATED.3IONS-SCNC: 10 MMOL/L (ref 3–16)
AST SERPL-CCNC: 15 U/L (ref 15–37)
BILIRUB SERPL-MCNC: 0.4 MG/DL (ref 0–1)
BUN SERPL-MCNC: 8 MG/DL (ref 7–20)
CALCIUM SERPL-MCNC: 9.8 MG/DL (ref 8.3–10.6)
CHLORIDE SERPL-SCNC: 101 MMOL/L (ref 99–110)
CO2 SERPL-SCNC: 27 MMOL/L (ref 21–32)
CREAT SERPL-MCNC: 0.9 MG/DL (ref 0.6–1.1)
DEPRECATED RDW RBC AUTO: 13.5 % (ref 12.4–15.4)
GFR SERPLBLD CREATININE-BSD FMLA CKD-EPI: >60 ML/MIN/{1.73_M2}
GLUCOSE SERPL-MCNC: 196 MG/DL (ref 70–99)
HBA1C MFR BLD: 7 %
HCT VFR BLD AUTO: 34.5 % (ref 36–48)
HGB BLD-MCNC: 11.7 G/DL (ref 12–16)
MCH RBC QN AUTO: 28.3 PG (ref 26–34)
MCHC RBC AUTO-ENTMCNC: 33.9 G/DL (ref 31–36)
MCV RBC AUTO: 83.4 FL (ref 80–100)
PLATELET # BLD AUTO: 325 K/UL (ref 135–450)
PMV BLD AUTO: 10.3 FL (ref 5–10.5)
POTASSIUM SERPL-SCNC: 4.4 MMOL/L (ref 3.5–5.1)
PROT SERPL-MCNC: 7.8 G/DL (ref 6.4–8.2)
RBC # BLD AUTO: 4.14 M/UL (ref 4–5.2)
SODIUM SERPL-SCNC: 138 MMOL/L (ref 136–145)
WBC # BLD AUTO: 4.6 K/UL (ref 4–11)

## 2023-07-19 PROCEDURE — 93000 ELECTROCARDIOGRAM COMPLETE: CPT | Performed by: STUDENT IN AN ORGANIZED HEALTH CARE EDUCATION/TRAINING PROGRAM

## 2023-07-19 PROCEDURE — 3079F DIAST BP 80-89 MM HG: CPT | Performed by: STUDENT IN AN ORGANIZED HEALTH CARE EDUCATION/TRAINING PROGRAM

## 2023-07-19 PROCEDURE — 99214 OFFICE O/P EST MOD 30 MIN: CPT | Performed by: STUDENT IN AN ORGANIZED HEALTH CARE EDUCATION/TRAINING PROGRAM

## 2023-07-19 PROCEDURE — G8427 DOCREV CUR MEDS BY ELIG CLIN: HCPCS | Performed by: STUDENT IN AN ORGANIZED HEALTH CARE EDUCATION/TRAINING PROGRAM

## 2023-07-19 PROCEDURE — 83037 HB GLYCOSYLATED A1C HOME DEV: CPT | Performed by: STUDENT IN AN ORGANIZED HEALTH CARE EDUCATION/TRAINING PROGRAM

## 2023-07-19 PROCEDURE — 2022F DILAT RTA XM EVC RTNOPTHY: CPT | Performed by: STUDENT IN AN ORGANIZED HEALTH CARE EDUCATION/TRAINING PROGRAM

## 2023-07-19 PROCEDURE — G8417 CALC BMI ABV UP PARAM F/U: HCPCS | Performed by: STUDENT IN AN ORGANIZED HEALTH CARE EDUCATION/TRAINING PROGRAM

## 2023-07-19 PROCEDURE — 1036F TOBACCO NON-USER: CPT | Performed by: STUDENT IN AN ORGANIZED HEALTH CARE EDUCATION/TRAINING PROGRAM

## 2023-07-19 PROCEDURE — 3051F HG A1C>EQUAL 7.0%<8.0%: CPT | Performed by: STUDENT IN AN ORGANIZED HEALTH CARE EDUCATION/TRAINING PROGRAM

## 2023-07-19 PROCEDURE — 3075F SYST BP GE 130 - 139MM HG: CPT | Performed by: STUDENT IN AN ORGANIZED HEALTH CARE EDUCATION/TRAINING PROGRAM

## 2023-07-19 ASSESSMENT — ENCOUNTER SYMPTOMS
NAUSEA: 0
WHEEZING: 0
SHORTNESS OF BREATH: 0

## 2023-07-19 NOTE — PROGRESS NOTES
HTN, COPD, morbid obesity (BMI ?40), active hepatitis, alcohol dependence or abuse, implanted pacemaker, moderate reduction of ejection fraction, ESRD undergoing regularly scheduled dialysis, premature infant PCA < 60 weeks, history (>3 months) of MI, CVA, TIA, or CAD/stents. ASA IV A patient with severe systemic disease that is a constant threat to life Examples include (but not limited to): recent ( < 3 months) MI, CVA, TIA, or CAD/stents, ongoing cardiac ischemia or severe valve dysfunction, severe reduction of ejection fraction, sepsis, DIC, BISI or ESRD not undergoing regularly scheduled dialysis   ASA V A moribund patient who is not expected to survive without the operation Examples include (but not limited to): ruptured abdominal/thoracic aneurysm, massive trauma, intracranial bleed with mass effect, ischemic bowel in the face of significant cardiac pathology or multiple organ/system dysfunction   ASA VI A declared brain-dead patient whose organs are being removed for donor purposes       RCRI Score Risk of major cardiac event*   0- class I 3.9%   1- class II 6%   2- class III 10.1%   ? 3- class IV 15%   *Defined as myocardial infarction, pulmonary edema, ventricular fibrillation/primary cardiac arrest, or complete heart block. Vicente Folds, DO     This dictation was generated by voice recognition computer software. Although all attempts are made to edit the dictation for accuracy, there may be errors in the transcription that are not intended.

## 2023-07-24 ENCOUNTER — OFFICE VISIT (OUTPATIENT)
Dept: CARDIOLOGY CLINIC | Age: 38
End: 2023-07-24
Payer: MEDICAID

## 2023-07-24 VITALS
WEIGHT: 287 LBS | HEART RATE: 98 BPM | BODY MASS INDEX: 42.38 KG/M2 | DIASTOLIC BLOOD PRESSURE: 88 MMHG | SYSTOLIC BLOOD PRESSURE: 124 MMHG

## 2023-07-24 DIAGNOSIS — R94.31 ABNORMAL EKG: Primary | ICD-10-CM

## 2023-07-24 PROCEDURE — G8427 DOCREV CUR MEDS BY ELIG CLIN: HCPCS | Performed by: INTERNAL MEDICINE

## 2023-07-24 PROCEDURE — 3078F DIAST BP <80 MM HG: CPT | Performed by: INTERNAL MEDICINE

## 2023-07-24 PROCEDURE — 99204 OFFICE O/P NEW MOD 45 MIN: CPT | Performed by: INTERNAL MEDICINE

## 2023-07-24 PROCEDURE — 3074F SYST BP LT 130 MM HG: CPT | Performed by: INTERNAL MEDICINE

## 2023-07-24 PROCEDURE — G8417 CALC BMI ABV UP PARAM F/U: HCPCS | Performed by: INTERNAL MEDICINE

## 2023-07-24 PROCEDURE — 93000 ELECTROCARDIOGRAM COMPLETE: CPT | Performed by: INTERNAL MEDICINE

## 2023-07-24 NOTE — PROGRESS NOTES
MCHC 33.9 07/19/2023 10:16 AM    RDW 13.5 07/19/2023 10:16 AM    LYMPHOPCT 26.6 05/11/2023 10:59 AM    MONOPCT 5.9 05/11/2023 10:59 AM    BASOPCT 0.3 05/11/2023 10:59 AM    MONOSABS 0.4 05/11/2023 10:59 AM    LYMPHSABS 1.9 05/11/2023 10:59 AM    EOSABS 0.5 05/11/2023 10:59 AM    BASOSABS 0.0 05/11/2023 10:59 AM     BMP:    Lab Results   Component Value Date/Time     07/19/2023 10:16 AM    K 4.4 07/19/2023 10:16 AM     07/19/2023 10:16 AM    CO2 27 07/19/2023 10:16 AM    BUN 8 07/19/2023 10:16 AM    LABALBU 4.1 07/19/2023 10:16 AM    CREATININE 0.9 07/19/2023 10:16 AM    CALCIUM 9.8 07/19/2023 10:16 AM    GFRAA >60 04/28/2022 02:17 PM    LABGLOM >60 07/19/2023 10:16 AM     Uric Acid:  No components found for: URIC  PT/INR:  No results found for: PROTIME, INR  Last 3 Troponin:  No results found for: TROPONINI  FLP:    Lab Results   Component Value Date/Time    TRIG 119 05/11/2023 10:59 AM    HDL 55 05/11/2023 10:59 AM    LDLCALC 73 05/11/2023 10:59 AM    LABVLDL 24 05/11/2023 10:59 AM       EKG: On 7/24/2023 sinus rhythm at 94/min. Minimal nonspecific ST changes essentially normal study. Abel Frankel echocardiogram  This patient was educated using the patient point room wall mount device. Absence from smokers and smoking and diet and exercising are important. Assessment/ Plan           1. Abnormal EKG  At this time essentially she is unremarkable. The EKG looks essentially normal to me and no significant change from the earlier preop EKG. I will plan to obtain an echocardiograph to have it on record for her with her hypertensive history. I think at this time she should be cleared for surgery. No significant increased risk. It is noted that she does take hydrocodone chlorothiazide 25 mg. Losartan 50 mg. Metformin 1000 mg twice daily for her PCOS. Vitamin D.  - EKG 12 Lead    We will be happy to follow her as necessary.   Thanks for allowing us the opportunity  to participate in the evaluation and care of

## 2023-08-14 ENCOUNTER — TELEPHONE (OUTPATIENT)
Dept: BARIATRICS/WEIGHT MGMT | Age: 38
End: 2023-08-14

## 2023-08-14 NOTE — TELEPHONE ENCOUNTER
Spoke with pt to confirm her 6 am arrival for her 8:30 am surgery on 8/15/23. Pt confirmed completion of her pre op diet, is following her clear liquid diet and reminded to be NPO after midnight.

## 2023-08-15 ENCOUNTER — HOSPITAL ENCOUNTER (INPATIENT)
Age: 38
LOS: 1 days | Discharge: HOME | DRG: 403 | End: 2023-08-16
Attending: SURGERY | Admitting: SURGERY
Payer: MEDICAID

## 2023-08-15 ENCOUNTER — ANESTHESIA EVENT (OUTPATIENT)
Dept: OPERATING ROOM | Age: 38
DRG: 403 | End: 2023-08-15
Payer: MEDICAID

## 2023-08-15 ENCOUNTER — ANESTHESIA (OUTPATIENT)
Dept: OPERATING ROOM | Age: 38
DRG: 403 | End: 2023-08-15
Payer: MEDICAID

## 2023-08-15 DIAGNOSIS — E66.01 MORBID OBESITY (HCC): ICD-10-CM

## 2023-08-15 DIAGNOSIS — E55.9 VITAMIN D DEFICIENCY: ICD-10-CM

## 2023-08-15 DIAGNOSIS — G89.18 POST-OP PAIN: Primary | ICD-10-CM

## 2023-08-15 DIAGNOSIS — I10 ESSENTIAL HYPERTENSION: ICD-10-CM

## 2023-08-15 PROBLEM — E88.810 METABOLIC SYNDROME: Status: ACTIVE | Noted: 2023-08-15

## 2023-08-15 PROBLEM — E88.81 METABOLIC SYNDROME: Status: ACTIVE | Noted: 2023-08-15

## 2023-08-15 LAB
ABO + RH BLD: NORMAL
BLD GP AB SCN SERPL QL: NORMAL
GLUCOSE BLD-MCNC: 158 MG/DL (ref 70–99)
HCG UR QL: NEGATIVE
PERFORMED ON: ABNORMAL

## 2023-08-15 PROCEDURE — 2500000003 HC RX 250 WO HCPCS: Performed by: SURGERY

## 2023-08-15 PROCEDURE — 94761 N-INVAS EAR/PLS OXIMETRY MLT: CPT

## 2023-08-15 PROCEDURE — 6360000002 HC RX W HCPCS: Performed by: NURSE ANESTHETIST, CERTIFIED REGISTERED

## 2023-08-15 PROCEDURE — 6370000000 HC RX 637 (ALT 250 FOR IP): Performed by: SURGERY

## 2023-08-15 PROCEDURE — C9399 UNCLASSIFIED DRUGS OR BIOLOG: HCPCS | Performed by: NURSE ANESTHETIST, CERTIFIED REGISTERED

## 2023-08-15 PROCEDURE — 2720000010 HC SURG SUPPLY STERILE: Performed by: SURGERY

## 2023-08-15 PROCEDURE — 86850 RBC ANTIBODY SCREEN: CPT

## 2023-08-15 PROCEDURE — 2580000003 HC RX 258: Performed by: ANESTHESIOLOGY

## 2023-08-15 PROCEDURE — 3600000019 HC SURGERY ROBOT ADDTL 15MIN: Performed by: SURGERY

## 2023-08-15 PROCEDURE — 2580000003 HC RX 258: Performed by: SURGERY

## 2023-08-15 PROCEDURE — 86900 BLOOD TYPING SEROLOGIC ABO: CPT

## 2023-08-15 PROCEDURE — 3600000009 HC SURGERY ROBOT BASE: Performed by: SURGERY

## 2023-08-15 PROCEDURE — 3700000000 HC ANESTHESIA ATTENDED CARE: Performed by: SURGERY

## 2023-08-15 PROCEDURE — C9113 INJ PANTOPRAZOLE SODIUM, VIA: HCPCS | Performed by: SURGERY

## 2023-08-15 PROCEDURE — 3700000001 HC ADD 15 MINUTES (ANESTHESIA): Performed by: SURGERY

## 2023-08-15 PROCEDURE — 2580000003 HC RX 258: Performed by: NURSE ANESTHETIST, CERTIFIED REGISTERED

## 2023-08-15 PROCEDURE — 2709999900 HC NON-CHARGEABLE SUPPLY: Performed by: SURGERY

## 2023-08-15 PROCEDURE — 6360000002 HC RX W HCPCS: Performed by: SURGERY

## 2023-08-15 PROCEDURE — 94150 VITAL CAPACITY TEST: CPT

## 2023-08-15 PROCEDURE — 8E0W4CZ ROBOTIC ASSISTED PROCEDURE OF TRUNK REGION, PERCUTANEOUS ENDOSCOPIC APPROACH: ICD-10-PCS | Performed by: SURGERY

## 2023-08-15 PROCEDURE — 88307 TISSUE EXAM BY PATHOLOGIST: CPT

## 2023-08-15 PROCEDURE — 7100000000 HC PACU RECOVERY - FIRST 15 MIN: Performed by: SURGERY

## 2023-08-15 PROCEDURE — 0DB64Z3 EXCISION OF STOMACH, PERCUTANEOUS ENDOSCOPIC APPROACH, VERTICAL: ICD-10-PCS | Performed by: SURGERY

## 2023-08-15 PROCEDURE — 1200000000 HC SEMI PRIVATE

## 2023-08-15 PROCEDURE — 88342 IMHCHEM/IMCYTCHM 1ST ANTB: CPT

## 2023-08-15 PROCEDURE — 7100000001 HC PACU RECOVERY - ADDTL 15 MIN: Performed by: SURGERY

## 2023-08-15 PROCEDURE — 2500000003 HC RX 250 WO HCPCS: Performed by: NURSE ANESTHETIST, CERTIFIED REGISTERED

## 2023-08-15 PROCEDURE — 86901 BLOOD TYPING SEROLOGIC RH(D): CPT

## 2023-08-15 PROCEDURE — 84703 CHORIONIC GONADOTROPIN ASSAY: CPT

## 2023-08-15 PROCEDURE — S2900 ROBOTIC SURGICAL SYSTEM: HCPCS | Performed by: SURGERY

## 2023-08-15 RX ORDER — ENALAPRILAT 1.25 MG/ML
1.25 INJECTION INTRAVENOUS EVERY 6 HOURS PRN
Status: DISCONTINUED | OUTPATIENT
Start: 2023-08-15 | End: 2023-08-16 | Stop reason: HOSPADM

## 2023-08-15 RX ORDER — MIDAZOLAM HYDROCHLORIDE 1 MG/ML
INJECTION INTRAMUSCULAR; INTRAVENOUS PRN
Status: DISCONTINUED | OUTPATIENT
Start: 2023-08-15 | End: 2023-08-15 | Stop reason: SDUPTHER

## 2023-08-15 RX ORDER — SODIUM CHLORIDE 0.9 % (FLUSH) 0.9 %
5-40 SYRINGE (ML) INJECTION PRN
Status: DISCONTINUED | OUTPATIENT
Start: 2023-08-15 | End: 2023-08-16 | Stop reason: HOSPADM

## 2023-08-15 RX ORDER — SODIUM CHLORIDE, SODIUM LACTATE, POTASSIUM CHLORIDE, CALCIUM CHLORIDE 600; 310; 30; 20 MG/100ML; MG/100ML; MG/100ML; MG/100ML
INJECTION, SOLUTION INTRAVENOUS CONTINUOUS
Status: DISCONTINUED | OUTPATIENT
Start: 2023-08-15 | End: 2023-08-15 | Stop reason: HOSPADM

## 2023-08-15 RX ORDER — METOCLOPRAMIDE HYDROCHLORIDE 5 MG/ML
10 INJECTION INTRAMUSCULAR; INTRAVENOUS EVERY 6 HOURS PRN
Status: DISCONTINUED | OUTPATIENT
Start: 2023-08-15 | End: 2023-08-16 | Stop reason: HOSPADM

## 2023-08-15 RX ORDER — PREGABALIN 150 MG/1
150 CAPSULE ORAL ONCE
Status: COMPLETED | OUTPATIENT
Start: 2023-08-15 | End: 2023-08-15

## 2023-08-15 RX ORDER — MEPERIDINE HYDROCHLORIDE 25 MG/ML
12.5 INJECTION INTRAMUSCULAR; INTRAVENOUS; SUBCUTANEOUS EVERY 5 MIN PRN
Status: DISCONTINUED | OUTPATIENT
Start: 2023-08-15 | End: 2023-08-15 | Stop reason: HOSPADM

## 2023-08-15 RX ORDER — ONDANSETRON 2 MG/ML
INJECTION INTRAMUSCULAR; INTRAVENOUS PRN
Status: DISCONTINUED | OUTPATIENT
Start: 2023-08-15 | End: 2023-08-15 | Stop reason: SDUPTHER

## 2023-08-15 RX ORDER — SODIUM CHLORIDE, SODIUM LACTATE, POTASSIUM CHLORIDE, CALCIUM CHLORIDE 600; 310; 30; 20 MG/100ML; MG/100ML; MG/100ML; MG/100ML
INJECTION, SOLUTION INTRAVENOUS CONTINUOUS PRN
Status: DISCONTINUED | OUTPATIENT
Start: 2023-08-15 | End: 2023-08-15 | Stop reason: SDUPTHER

## 2023-08-15 RX ORDER — HYOSCYAMINE SULFATE 0.5 MG/ML
250 INJECTION, SOLUTION SUBCUTANEOUS EVERY 4 HOURS PRN
Status: DISCONTINUED | OUTPATIENT
Start: 2023-08-15 | End: 2023-08-16 | Stop reason: HOSPADM

## 2023-08-15 RX ORDER — SODIUM CHLORIDE 9 MG/ML
INJECTION, SOLUTION INTRAVENOUS PRN
Status: DISCONTINUED | OUTPATIENT
Start: 2023-08-15 | End: 2023-08-16 | Stop reason: HOSPADM

## 2023-08-15 RX ORDER — SCOLOPAMINE TRANSDERMAL SYSTEM 1 MG/1
1 PATCH, EXTENDED RELEASE TRANSDERMAL
Status: DISCONTINUED | OUTPATIENT
Start: 2023-08-15 | End: 2023-08-16 | Stop reason: HOSPADM

## 2023-08-15 RX ORDER — LABETALOL HYDROCHLORIDE 5 MG/ML
10 INJECTION, SOLUTION INTRAVENOUS
Status: DISCONTINUED | OUTPATIENT
Start: 2023-08-15 | End: 2023-08-15 | Stop reason: HOSPADM

## 2023-08-15 RX ORDER — APREPITANT 40 MG/1
80 CAPSULE ORAL ONCE
Status: COMPLETED | OUTPATIENT
Start: 2023-08-15 | End: 2023-08-15

## 2023-08-15 RX ORDER — SODIUM CHLORIDE, SODIUM LACTATE, POTASSIUM CHLORIDE, AND CALCIUM CHLORIDE .6; .31; .03; .02 G/100ML; G/100ML; G/100ML; G/100ML
IRRIGANT IRRIGATION PRN
Status: DISCONTINUED | OUTPATIENT
Start: 2023-08-15 | End: 2023-08-15 | Stop reason: HOSPADM

## 2023-08-15 RX ORDER — SODIUM CHLORIDE 9 MG/ML
INJECTION, SOLUTION INTRAVENOUS PRN
Status: DISCONTINUED | OUTPATIENT
Start: 2023-08-15 | End: 2023-08-15 | Stop reason: HOSPADM

## 2023-08-15 RX ORDER — NALOXONE HYDROCHLORIDE 0.4 MG/ML
INJECTION, SOLUTION INTRAMUSCULAR; INTRAVENOUS; SUBCUTANEOUS PRN
Status: DISCONTINUED | OUTPATIENT
Start: 2023-08-15 | End: 2023-08-16

## 2023-08-15 RX ORDER — DIPHENHYDRAMINE HYDROCHLORIDE 50 MG/ML
12.5 INJECTION INTRAMUSCULAR; INTRAVENOUS
Status: DISCONTINUED | OUTPATIENT
Start: 2023-08-15 | End: 2023-08-15 | Stop reason: HOSPADM

## 2023-08-15 RX ORDER — ACETAMINOPHEN 160 MG/5ML
650 SOLUTION ORAL ONCE
Status: COMPLETED | OUTPATIENT
Start: 2023-08-15 | End: 2023-08-15

## 2023-08-15 RX ORDER — METOCLOPRAMIDE HYDROCHLORIDE 5 MG/ML
10 INJECTION INTRAMUSCULAR; INTRAVENOUS
Status: DISCONTINUED | OUTPATIENT
Start: 2023-08-15 | End: 2023-08-15 | Stop reason: HOSPADM

## 2023-08-15 RX ORDER — PROPOFOL 10 MG/ML
INJECTION, EMULSION INTRAVENOUS PRN
Status: DISCONTINUED | OUTPATIENT
Start: 2023-08-15 | End: 2023-08-15 | Stop reason: SDUPTHER

## 2023-08-15 RX ORDER — SODIUM CHLORIDE 0.9 % (FLUSH) 0.9 %
5-40 SYRINGE (ML) INJECTION PRN
Status: DISCONTINUED | OUTPATIENT
Start: 2023-08-15 | End: 2023-08-15 | Stop reason: HOSPADM

## 2023-08-15 RX ORDER — SODIUM CHLORIDE 9 MG/ML
INJECTION, SOLUTION INTRAVENOUS CONTINUOUS
Status: DISCONTINUED | OUTPATIENT
Start: 2023-08-15 | End: 2023-08-15 | Stop reason: HOSPADM

## 2023-08-15 RX ORDER — SODIUM CHLORIDE 0.9 % (FLUSH) 0.9 %
5-40 SYRINGE (ML) INJECTION EVERY 12 HOURS SCHEDULED
Status: DISCONTINUED | OUTPATIENT
Start: 2023-08-15 | End: 2023-08-15 | Stop reason: HOSPADM

## 2023-08-15 RX ORDER — LIDOCAINE HYDROCHLORIDE 20 MG/ML
INJECTION, SOLUTION INTRAVENOUS PRN
Status: DISCONTINUED | OUTPATIENT
Start: 2023-08-15 | End: 2023-08-15 | Stop reason: SDUPTHER

## 2023-08-15 RX ORDER — ENOXAPARIN SODIUM 100 MG/ML
40 INJECTION SUBCUTANEOUS EVERY 12 HOURS SCHEDULED
Status: DISCONTINUED | OUTPATIENT
Start: 2023-08-15 | End: 2023-08-16 | Stop reason: HOSPADM

## 2023-08-15 RX ORDER — SODIUM CHLORIDE 0.9 % (FLUSH) 0.9 %
5-40 SYRINGE (ML) INJECTION EVERY 12 HOURS SCHEDULED
Status: DISCONTINUED | OUTPATIENT
Start: 2023-08-15 | End: 2023-08-16 | Stop reason: HOSPADM

## 2023-08-15 RX ORDER — PROCHLORPERAZINE EDISYLATE 5 MG/ML
5 INJECTION INTRAMUSCULAR; INTRAVENOUS EVERY 6 HOURS PRN
Status: DISCONTINUED | OUTPATIENT
Start: 2023-08-15 | End: 2023-08-16 | Stop reason: HOSPADM

## 2023-08-15 RX ORDER — ROCURONIUM BROMIDE 10 MG/ML
INJECTION, SOLUTION INTRAVENOUS PRN
Status: DISCONTINUED | OUTPATIENT
Start: 2023-08-15 | End: 2023-08-15 | Stop reason: SDUPTHER

## 2023-08-15 RX ORDER — FAMOTIDINE 10 MG/ML
INJECTION, SOLUTION INTRAVENOUS PRN
Status: DISCONTINUED | OUTPATIENT
Start: 2023-08-15 | End: 2023-08-15 | Stop reason: SDUPTHER

## 2023-08-15 RX ORDER — MAGNESIUM HYDROXIDE 1200 MG/15ML
LIQUID ORAL PRN
Status: DISCONTINUED | OUTPATIENT
Start: 2023-08-15 | End: 2023-08-15 | Stop reason: HOSPADM

## 2023-08-15 RX ORDER — HYDRALAZINE HYDROCHLORIDE 20 MG/ML
10 INJECTION INTRAMUSCULAR; INTRAVENOUS
Status: DISCONTINUED | OUTPATIENT
Start: 2023-08-15 | End: 2023-08-15 | Stop reason: HOSPADM

## 2023-08-15 RX ORDER — BUPIVACAINE HYDROCHLORIDE 5 MG/ML
INJECTION, SOLUTION EPIDURAL; INTRACAUDAL PRN
Status: DISCONTINUED | OUTPATIENT
Start: 2023-08-15 | End: 2023-08-15 | Stop reason: HOSPADM

## 2023-08-15 RX ORDER — FENTANYL CITRATE 50 UG/ML
INJECTION, SOLUTION INTRAMUSCULAR; INTRAVENOUS PRN
Status: DISCONTINUED | OUTPATIENT
Start: 2023-08-15 | End: 2023-08-15 | Stop reason: SDUPTHER

## 2023-08-15 RX ORDER — SCOLOPAMINE TRANSDERMAL SYSTEM 1 MG/1
1 PATCH, EXTENDED RELEASE TRANSDERMAL ONCE
Status: DISCONTINUED | OUTPATIENT
Start: 2023-08-15 | End: 2023-08-16 | Stop reason: HOSPADM

## 2023-08-15 RX ORDER — ENOXAPARIN SODIUM 100 MG/ML
40 INJECTION SUBCUTANEOUS ONCE
Status: COMPLETED | OUTPATIENT
Start: 2023-08-15 | End: 2023-08-15

## 2023-08-15 RX ORDER — HYDROMORPHONE HYDROCHLORIDE 2 MG/ML
INJECTION, SOLUTION INTRAMUSCULAR; INTRAVENOUS; SUBCUTANEOUS PRN
Status: DISCONTINUED | OUTPATIENT
Start: 2023-08-15 | End: 2023-08-15 | Stop reason: SDUPTHER

## 2023-08-15 RX ORDER — SODIUM CHLORIDE 9 MG/ML
INJECTION, SOLUTION INTRAVENOUS CONTINUOUS
Status: DISCONTINUED | OUTPATIENT
Start: 2023-08-15 | End: 2023-08-16 | Stop reason: HOSPADM

## 2023-08-15 RX ORDER — LIDOCAINE HYDROCHLORIDE 10 MG/ML
1 INJECTION, SOLUTION EPIDURAL; INFILTRATION; INTRACAUDAL; PERINEURAL
Status: DISCONTINUED | OUTPATIENT
Start: 2023-08-15 | End: 2023-08-15 | Stop reason: HOSPADM

## 2023-08-15 RX ADMIN — SODIUM CHLORIDE: 9 INJECTION, SOLUTION INTRAVENOUS at 12:04

## 2023-08-15 RX ADMIN — SODIUM CHLORIDE, SODIUM LACTATE, POTASSIUM CHLORIDE, AND CALCIUM CHLORIDE: .6; .31; .03; .02 INJECTION, SOLUTION INTRAVENOUS at 08:47

## 2023-08-15 RX ADMIN — ENALAPRILAT 1.25 MG: 2.5 INJECTION INTRAVENOUS at 22:35

## 2023-08-15 RX ADMIN — SODIUM CHLORIDE 3000 MG: 9 INJECTION, SOLUTION INTRAVENOUS at 08:47

## 2023-08-15 RX ADMIN — FAMOTIDINE 20 MG: 10 INJECTION, SOLUTION INTRAVENOUS at 09:05

## 2023-08-15 RX ADMIN — SODIUM CHLORIDE, POTASSIUM CHLORIDE, SODIUM LACTATE AND CALCIUM CHLORIDE: 600; 310; 30; 20 INJECTION, SOLUTION INTRAVENOUS at 08:01

## 2023-08-15 RX ADMIN — PREGABALIN 150 MG: 150 CAPSULE ORAL at 07:14

## 2023-08-15 RX ADMIN — FENTANYL CITRATE 100 MCG: 50 INJECTION, SOLUTION INTRAMUSCULAR; INTRAVENOUS at 08:47

## 2023-08-15 RX ADMIN — SODIUM CHLORIDE: 9 INJECTION, SOLUTION INTRAVENOUS at 14:55

## 2023-08-15 RX ADMIN — HYDROMORPHONE HYDROCHLORIDE 2 MG: 2 INJECTION, SOLUTION INTRAMUSCULAR; INTRAVENOUS; SUBCUTANEOUS at 09:02

## 2023-08-15 RX ADMIN — ENOXAPARIN SODIUM 40 MG: 100 INJECTION SUBCUTANEOUS at 22:35

## 2023-08-15 RX ADMIN — HYOSCYAMINE SULFATE 250 MCG: 0.5 INJECTION, SOLUTION SUBCUTANEOUS at 14:53

## 2023-08-15 RX ADMIN — PROPOFOL 150 MG: 10 INJECTION, EMULSION INTRAVENOUS at 08:51

## 2023-08-15 RX ADMIN — APREPITANT 80 MG: 40 CAPSULE ORAL at 07:13

## 2023-08-15 RX ADMIN — SODIUM CHLORIDE, SODIUM LACTATE, POTASSIUM CHLORIDE, CALCIUM CHLORIDE: 600; 310; 30; 20 INJECTION, SOLUTION INTRAVENOUS at 08:47

## 2023-08-15 RX ADMIN — ACETAMINOPHEN 650 MG: 650 SOLUTION ORAL at 07:13

## 2023-08-15 RX ADMIN — SODIUM CHLORIDE, POTASSIUM CHLORIDE, SODIUM LACTATE AND CALCIUM CHLORIDE: 600; 310; 30; 20 INJECTION, SOLUTION INTRAVENOUS at 08:26

## 2023-08-15 RX ADMIN — ONDANSETRON 4 MG: 2 INJECTION INTRAMUSCULAR; INTRAVENOUS at 10:34

## 2023-08-15 RX ADMIN — LIDOCAINE HYDROCHLORIDE 100 MG: 20 INJECTION, SOLUTION INTRAVENOUS at 08:50

## 2023-08-15 RX ADMIN — METHOCARBAMOL 500 MG: 100 INJECTION INTRAMUSCULAR; INTRAVENOUS at 11:45

## 2023-08-15 RX ADMIN — ROCURONIUM BROMIDE 100 MG: 10 INJECTION INTRAVENOUS at 08:51

## 2023-08-15 RX ADMIN — MIDAZOLAM HYDROCHLORIDE 2 MG: 2 INJECTION, SOLUTION INTRAMUSCULAR; INTRAVENOUS at 08:47

## 2023-08-15 RX ADMIN — SODIUM CHLORIDE: 9 INJECTION, SOLUTION INTRAVENOUS at 20:13

## 2023-08-15 RX ADMIN — ONDANSETRON 4 MG: 2 INJECTION INTRAMUSCULAR; INTRAVENOUS at 08:57

## 2023-08-15 RX ADMIN — SODIUM CHLORIDE, PRESERVATIVE FREE 40 MG: 5 INJECTION INTRAVENOUS at 14:53

## 2023-08-15 RX ADMIN — SODIUM CHLORIDE, SODIUM LACTATE, POTASSIUM CHLORIDE, AND CALCIUM CHLORIDE: .6; .31; .03; .02 INJECTION, SOLUTION INTRAVENOUS at 10:34

## 2023-08-15 RX ADMIN — SUGAMMADEX 200 MG: 100 INJECTION, SOLUTION INTRAVENOUS at 10:34

## 2023-08-15 RX ADMIN — Medication: at 11:49

## 2023-08-15 RX ADMIN — ENOXAPARIN SODIUM 40 MG: 100 INJECTION SUBCUTANEOUS at 08:01

## 2023-08-15 RX ADMIN — METOCLOPRAMIDE HYDROCHLORIDE 10 MG: 5 INJECTION INTRAMUSCULAR; INTRAVENOUS at 14:54

## 2023-08-15 RX ADMIN — METHOCARBAMOL 500 MG: 100 INJECTION INTRAMUSCULAR; INTRAVENOUS at 20:14

## 2023-08-15 ASSESSMENT — PAIN SCALES - GENERAL
PAINLEVEL_OUTOF10: 0
PAINLEVEL_OUTOF10: 4
PAINLEVEL_OUTOF10: 3

## 2023-08-15 ASSESSMENT — PAIN DESCRIPTION - ORIENTATION
ORIENTATION: MID
ORIENTATION: MID

## 2023-08-15 ASSESSMENT — PAIN DESCRIPTION - PAIN TYPE
TYPE: SURGICAL PAIN
TYPE: SURGICAL PAIN

## 2023-08-15 ASSESSMENT — PAIN - FUNCTIONAL ASSESSMENT: PAIN_FUNCTIONAL_ASSESSMENT: 0-10

## 2023-08-15 ASSESSMENT — PAIN DESCRIPTION - LOCATION
LOCATION: ABDOMEN
LOCATION: ABDOMEN

## 2023-08-15 NOTE — ANESTHESIA PRE PROCEDURE
10:16 AM    AST 15 07/19/2023 10:16 AM    ALT 14 07/19/2023 10:16 AM       POC Tests: No results for input(s): POCGLU, POCNA, POCK, POCCL, POCBUN, POCHEMO, POCHCT in the last 72 hours. Coags: No results found for: PROTIME, INR, APTT    HCG (If Applicable):   Lab Results   Component Value Date    PREGTESTUR Negative 08/15/2023        ABGs: No results found for: PHART, PO2ART, SZN9MZE, PFE7UGL, BEART, S4SKKXCI     Type & Screen (If Applicable):  No results found for: LABABO, LABRH    Drug/Infectious Status (If Applicable):  No results found for: HIV, HEPCAB    COVID-19 Screening (If Applicable): No results found for: COVID19        Anesthesia Evaluation  Patient summary reviewed and Nursing notes reviewed no history of anesthetic complications:   Airway: Mallampati: II  TM distance: >3 FB   Neck ROM: full  Mouth opening: > = 3 FB   Dental:          Pulmonary:Negative Pulmonary ROS                              Cardiovascular:    (+) hypertension:,                   Neuro/Psych:   Negative Neuro/Psych ROS              GI/Hepatic/Renal:   (+) morbid obesity          Endo/Other:    (+) DiabetesType II DM, , .                 Abdominal:             Vascular: negative vascular ROS. Other Findings:           Anesthesia Plan      general     ASA 1    (68-year-old female presents for ROBOTIC ASSISTED LAPAROSCOPIC SLEEVE GASTRECTOMY. Plan general anesthesia with ASA standard monitors. Erector spinae block as needed for postoperative pain control if requested by the attending surgeon. Questions answered. Patient agreeable with anesthetic plan.  )  Induction: intravenous. Anesthetic plan and risks discussed with patient. Plan discussed with CRNA.     Attending anesthesiologist reviewed and agrees with Eden Dubon MD   8/15/2023

## 2023-08-15 NOTE — H&P
Update History & Physical    The patient's History and Physical of July 18, 2023 was reviewed with the patient and I examined the patient. There was no change. The surgical site was confirmed by the patient and me. Plan: The risks, benefits, expected outcome, and alternative to the recommended procedure have been discussed with the patient. Patient understands and wants to proceed with the procedure.      Electronically signed by Alessia Barrios DO on 8/15/2023 at 8:16 AM

## 2023-08-15 NOTE — ANESTHESIA POSTPROCEDURE EVALUATION
Department of Anesthesiology  Postprocedure Note    Patient: Shelton Carroll  MRN: [id number]  YOB: 1985  Date of evaluation: 8/15/2023      Procedure Summary     Date: 08/15/23 Room / Location: [address] [place] [address]    Anesthesia Start: 0742 Anesthesia Stop: 1053    Procedure: ROBOTIC ASSISTED LAPAROSCOPIC SLEEVE GASTRECTOMY (Abdomen) Diagnosis:       Morbid obesity ([address])      (Morbid obesity ([address]) [E66.01])    Surgeons: Josafat Felipe DO Responsible Provider: Barbara Tomlinson MD    Anesthesia Type: general ASA Status: 3          Anesthesia Type: No value filed.     Susan Phase I: Susan Score: 8    Susan Phase II:        Anesthesia Post Evaluation    Patient location during evaluation: PACU  Patient participation: complete - patient participated  Level of consciousness: awake and alert  Pain score: 4  Airway patency: patent  Nausea & Vomiting: no nausea and no vomiting  Complications: no  Cardiovascular status: hemodynamically stable  Respiratory status: acceptable  Hydration status: euvolemic  Pain management: adequate

## 2023-08-15 NOTE — OP NOTE
the procedure well. Disposition: PACU - hemodynamically stable. Condition: stable    Attending Attestation: I was present and scrubbed for the entire procedure.

## 2023-08-16 VITALS
OXYGEN SATURATION: 100 % | HEART RATE: 99 BPM | RESPIRATION RATE: 18 BRPM | WEIGHT: 276.2 LBS | SYSTOLIC BLOOD PRESSURE: 140 MMHG | TEMPERATURE: 98.4 F | BODY MASS INDEX: 41.86 KG/M2 | HEIGHT: 68 IN | DIASTOLIC BLOOD PRESSURE: 75 MMHG

## 2023-08-16 LAB
ANION GAP SERPL CALCULATED.3IONS-SCNC: 16 MMOL/L (ref 3–16)
BUN SERPL-MCNC: 6 MG/DL (ref 7–20)
CALCIUM SERPL-MCNC: 8 MG/DL (ref 8.3–10.6)
CHLORIDE SERPL-SCNC: 100 MMOL/L (ref 99–110)
CO2 SERPL-SCNC: 20 MMOL/L (ref 21–32)
CREAT SERPL-MCNC: 0.8 MG/DL (ref 0.6–1.1)
DEPRECATED RDW RBC AUTO: 14.4 % (ref 12.4–15.4)
GFR SERPLBLD CREATININE-BSD FMLA CKD-EPI: >60 ML/MIN/{1.73_M2}
GLUCOSE SERPL-MCNC: 136 MG/DL (ref 70–99)
HCT VFR BLD AUTO: 31.5 % (ref 36–48)
HGB BLD-MCNC: 10.3 G/DL (ref 12–16)
MCH RBC QN AUTO: 27.7 PG (ref 26–34)
MCHC RBC AUTO-ENTMCNC: 32.6 G/DL (ref 31–36)
MCV RBC AUTO: 85.1 FL (ref 80–100)
PLATELET # BLD AUTO: 264 K/UL (ref 135–450)
PMV BLD AUTO: 9.9 FL (ref 5–10.5)
POTASSIUM SERPL-SCNC: 3.6 MMOL/L (ref 3.5–5.1)
RBC # BLD AUTO: 3.7 M/UL (ref 4–5.2)
SODIUM SERPL-SCNC: 136 MMOL/L (ref 136–145)
WBC # BLD AUTO: 11.1 K/UL (ref 4–11)

## 2023-08-16 PROCEDURE — C9113 INJ PANTOPRAZOLE SODIUM, VIA: HCPCS | Performed by: SURGERY

## 2023-08-16 PROCEDURE — 6360000002 HC RX W HCPCS: Performed by: NURSE PRACTITIONER

## 2023-08-16 PROCEDURE — 2580000003 HC RX 258: Performed by: SURGERY

## 2023-08-16 PROCEDURE — 36415 COLL VENOUS BLD VENIPUNCTURE: CPT

## 2023-08-16 PROCEDURE — 6370000000 HC RX 637 (ALT 250 FOR IP): Performed by: NURSE PRACTITIONER

## 2023-08-16 PROCEDURE — 6360000002 HC RX W HCPCS: Performed by: SURGERY

## 2023-08-16 PROCEDURE — 80048 BASIC METABOLIC PNL TOTAL CA: CPT

## 2023-08-16 PROCEDURE — 2580000003 HC RX 258: Performed by: NURSE PRACTITIONER

## 2023-08-16 PROCEDURE — 85027 COMPLETE CBC AUTOMATED: CPT

## 2023-08-16 RX ORDER — OMEPRAZOLE 40 MG/1
40 CAPSULE, DELAYED RELEASE ORAL
Qty: 42 CAPSULE | Refills: 0 | Status: SHIPPED | OUTPATIENT
Start: 2023-08-16 | End: 2023-09-27

## 2023-08-16 RX ORDER — ONDANSETRON 4 MG/1
4 TABLET, FILM COATED ORAL EVERY 8 HOURS PRN
Qty: 21 TABLET | Refills: 0 | Status: SHIPPED | OUTPATIENT
Start: 2023-08-16 | End: 2023-08-23

## 2023-08-16 RX ORDER — ERGOCALCIFEROL 1.25 MG/1
50000 CAPSULE ORAL WEEKLY
Qty: 4 CAPSULE | Refills: 5 | Status: SHIPPED
Start: 2023-08-30

## 2023-08-16 RX ORDER — HYDROCHLOROTHIAZIDE 25 MG/1
25 TABLET ORAL EVERY MORNING
Qty: 30 TABLET | Refills: 3 | Status: SHIPPED
Start: 2023-08-23

## 2023-08-16 RX ORDER — OXYCODONE HCL 5 MG/5 ML
10 SOLUTION, ORAL ORAL EVERY 4 HOURS PRN
Status: DISCONTINUED | OUTPATIENT
Start: 2023-08-16 | End: 2023-08-16 | Stop reason: HOSPADM

## 2023-08-16 RX ORDER — HYOSCYAMINE SULFATE 0.125 MG
125 TABLET ORAL EVERY 4 HOURS PRN
Qty: 28 TABLET | Refills: 0 | Status: SHIPPED | OUTPATIENT
Start: 2023-08-16 | End: 2023-08-23

## 2023-08-16 RX ORDER — OXYCODONE HYDROCHLORIDE 5 MG/1
5 TABLET ORAL EVERY 6 HOURS PRN
Qty: 28 TABLET | Refills: 0 | Status: SHIPPED | OUTPATIENT
Start: 2023-08-16 | End: 2023-08-23

## 2023-08-16 RX ORDER — OXYCODONE HCL 5 MG/5 ML
5 SOLUTION, ORAL ORAL EVERY 4 HOURS PRN
Status: DISCONTINUED | OUTPATIENT
Start: 2023-08-16 | End: 2023-08-16 | Stop reason: HOSPADM

## 2023-08-16 RX ORDER — FERROUS SULFATE 325(65) MG
325 TABLET ORAL
Qty: 30 TABLET | Refills: 3 | Status: SHIPPED
Start: 2023-08-30

## 2023-08-16 RX ORDER — LIDOCAINE 4 G/G
1 PATCH TOPICAL DAILY
Qty: 7 EACH | Refills: 0 | Status: SHIPPED | OUTPATIENT
Start: 2023-08-17 | End: 2023-08-24

## 2023-08-16 RX ORDER — LIDOCAINE 4 G/G
1 PATCH TOPICAL DAILY
Status: DISCONTINUED | OUTPATIENT
Start: 2023-08-16 | End: 2023-08-16 | Stop reason: HOSPADM

## 2023-08-16 RX ORDER — METHOCARBAMOL 750 MG/1
750 TABLET, FILM COATED ORAL 4 TIMES DAILY PRN
Qty: 28 TABLET | Refills: 0 | Status: SHIPPED | OUTPATIENT
Start: 2023-08-16 | End: 2023-08-23

## 2023-08-16 RX ADMIN — ENOXAPARIN SODIUM 40 MG: 100 INJECTION SUBCUTANEOUS at 07:45

## 2023-08-16 RX ADMIN — OXYCODONE HYDROCHLORIDE 5 MG: 5 SOLUTION ORAL at 07:45

## 2023-08-16 RX ADMIN — SODIUM CHLORIDE: 9 INJECTION, SOLUTION INTRAVENOUS at 03:44

## 2023-08-16 RX ADMIN — SODIUM CHLORIDE, PRESERVATIVE FREE 40 MG: 5 INJECTION INTRAVENOUS at 07:45

## 2023-08-16 RX ADMIN — METHOCARBAMOL 500 MG: 100 INJECTION INTRAMUSCULAR; INTRAVENOUS at 03:45

## 2023-08-16 RX ADMIN — HYOSCYAMINE SULFATE 250 MCG: 0.5 INJECTION, SOLUTION SUBCUTANEOUS at 03:46

## 2023-08-16 RX ADMIN — METHOCARBAMOL 500 MG: 100 INJECTION INTRAMUSCULAR; INTRAVENOUS at 11:44

## 2023-08-16 ASSESSMENT — PAIN DESCRIPTION - LOCATION
LOCATION: ABDOMEN
LOCATION: ABDOMEN

## 2023-08-16 ASSESSMENT — PAIN SCALES - GENERAL
PAINLEVEL_OUTOF10: 5
PAINLEVEL_OUTOF10: 2

## 2023-08-16 ASSESSMENT — PAIN - FUNCTIONAL ASSESSMENT
PAIN_FUNCTIONAL_ASSESSMENT: ACTIVITIES ARE NOT PREVENTED
PAIN_FUNCTIONAL_ASSESSMENT: ACTIVITIES ARE NOT PREVENTED

## 2023-08-16 ASSESSMENT — PAIN DESCRIPTION - ONSET
ONSET: ON-GOING
ONSET: ON-GOING

## 2023-08-16 ASSESSMENT — PAIN DESCRIPTION - PAIN TYPE
TYPE: SURGICAL PAIN
TYPE: SURGICAL PAIN

## 2023-08-16 ASSESSMENT — PAIN DESCRIPTION - DESCRIPTORS
DESCRIPTORS: ACHING
DESCRIPTORS: ACHING

## 2023-08-16 ASSESSMENT — PAIN DESCRIPTION - FREQUENCY
FREQUENCY: CONTINUOUS
FREQUENCY: CONTINUOUS

## 2023-08-16 ASSESSMENT — PAIN DESCRIPTION - ORIENTATION
ORIENTATION: LEFT;LOWER
ORIENTATION: LOWER;LEFT

## 2023-08-16 NOTE — DISCHARGE SUMMARY
Patient ID:  Sohail Winter  [id number]  89 y.o.  1985    Admit date: 8/15/2023    Discharge date and time: 8/16/2023    Admitting Physician: Jamee Leal DO     Discharge Physician: same as above    Admission Diagnoses: Morbid obesity ([address]) [G05.83]  Metabolic syndrome [S53.48]    Discharge Diagnoses: same as above    Admission Condition: good    Discharged Condition: good    Indication for Admission: Surgery: Robotic assisted Laparoscopic Sleeve Gastrectomy, IV hydration, monitoring, pain and nausea management    Hospital Course: 43 y.o. female who was admitted for elective surgery related to morbid obesity. The patient underwent Robotic assisted laparoscopic sleeve gastrectomy. Surgery was uneventful and was admitted to bariatric post-operative surgical floor in stable condition for IV hydration, monitoring and pain and nausea management. The following morning pain was tolerable on po pain medication, was ambulatory, voiding freely, and taking adequate bariatric clear liquids. The patient was discharged in stable condition, POD# 1. Treatments: IV hydration    Disposition: home    Patient Instructions: Activity: activity as tolerated and no driving while on analgesics  Diet: bariatric clear liquids, carb controlled   Wound Care: as directed  Medications: Medications should be crushed or opened and sprinkled for 2 weeks after surgery   Follow-up with Dr. Tiffani Guadalupe in two weeks.         Signed:  NURY Hurt CNP 8/16/2023 4:11 PM

## 2023-08-16 NOTE — CONSULTS
Clinical Pharmacy Progress Note    Admit date: 8/15/2023     Asked to review home medications to see what can be crushed or changed to liquid formulation. Ferrous sulfate - tablets are very difficult to crush. Recommend using liquid form - 300mg/5mL po daily. Hydrochlorothiazide - tablets can be crushed. Losartan - tablets can be crushed. Metformin - tablets can be crushed. KIMBERLEE birth control pill - tablets can be crushed. Ergocalciferol - capsules cannot be opened. Tablet formulations are not available in this high dose - recommend sub with Cholecalciferol 5000 unit tablet daily (which can be crushed) until able to take oral capsule again.     Please call with questions--  Thanks--  Araceli Gracia, AryaD, BCPS, BCGP  [id number] ([facility])   8/16/2023 9:05 AM

## 2023-08-17 ENCOUNTER — TELEPHONE (OUTPATIENT)
Dept: BARIATRICS/WEIGHT MGMT | Age: 38
End: 2023-08-17

## 2023-08-17 ENCOUNTER — CARE COORDINATION (OUTPATIENT)
Dept: CASE MANAGEMENT | Age: 38
End: 2023-08-17

## 2023-08-17 DIAGNOSIS — E55.9 VITAMIN D DEFICIENCY: Primary | ICD-10-CM

## 2023-08-17 NOTE — TELEPHONE ENCOUNTER
Patient states she can not remember if she is supposed crush her birth control pill or not? Is she to not take it at all? Just needing to clarify this question. Please call and advise.

## 2023-08-17 NOTE — TELEPHONE ENCOUNTER
Called and spoke to pt. Did advise pt to please crush her birth control. I did let her know that is it up to her if she wants to take that current medication or not was her choice . But I did advise her, if she does not take her birth control to please use other means of protection.   Pt understood

## 2023-08-17 NOTE — CARE COORDINATION
[facility] Care Transitions Initial Follow Up Call    Call within 2 business days of discharge:  yes       Patient Current Location:    Home: [facility] [address]    Care Transition Nurse contacted the patient by telephone to perform post hospital discharge assessment. Verified name with patient as identifier. Provided introduction to self, and explanation of the Care Transition Nurse role. Patient: Ruben Patel Patient :  1985  MRN: [id number]  Reason for Admission:  gastrectomy  Discharge Date:    RARS: 7      Last Discharge [address] [facility]       Date Complaint Diagnosis Description Type Department Provider    8/15/23  Post-op pain . .. Admission (Discharged) [facility]              Challenges to be reviewed by the provider   Additional needs identified to be addressed with provider:   yes   - hfu declined                                                      Method of communication with provider : chart routing    SUMMARY  CTN spoke to the pt who reports the following:      -C/O \"little\" cough and chest congestion since procedure. Advised f/u with pcp if  s/s worsen or other s/s develop.  -Pain at surgical site rated 4/10. Taking pain med PRN. Denies s/s of infection at surgical site. -HX of DM: has not checked BS yet today. Taking Metformin. Denies s/s of hypoglycemia and hyperglycemia. Educated on infection and associated s/s of DM.   -Denies fever, chills, nausea, vomiting, sinus congestion, SOB / DB, wheezing, chest pain, feeling lightheaded / dizziness, heart palpitations / flutters, fatigue, and weakness.  -Denies issues with fluid intake, appetite, urination, and LBM today. -CTC and Pt reviewed meds and CTC completed the 1111f. Holding iron supplement, HCTZ, Vitamin D, and birth control as recommended. States she will continue to work with physician on when to restart meds.     -HFU with surgeon, Dr Micky Jacobo .  -Declined HFU with PCP and has appt 10/19 and plans

## 2023-08-17 NOTE — TELEPHONE ENCOUNTER
Called and spoke to pt. Did let pt know per provider to hold off on the birth control till her 2wk appt.

## 2023-08-18 DIAGNOSIS — E11.9 TYPE 2 DIABETES MELLITUS WITHOUT COMPLICATION, WITHOUT LONG-TERM CURRENT USE OF INSULIN (HCC): Primary | ICD-10-CM

## 2023-08-18 RX ORDER — BLOOD-GLUCOSE METER
1 KIT MISCELLANEOUS DAILY
Qty: 1 KIT | Refills: 0 | Status: SHIPPED | OUTPATIENT
Start: 2023-08-18

## 2023-08-21 ENCOUNTER — TELEPHONE (OUTPATIENT)
Dept: BARIATRICS/WEIGHT MGMT | Age: 38
End: 2023-08-21

## 2023-08-21 NOTE — TELEPHONE ENCOUNTER
Surgery Type: Sleeve    Surgery Date: 8/15/23    Surgeon: Leigh Ann Ruiz     The patient was contacted to follow up on their recent bariatric surgery. The following topics were reviewed:    [x] Hydration is Adequate            [x]Patient is getting at least 48-64 oz of fluids a day, not including protein shakes. [x]Consuming Adequate Protein         [x]Consuming 2 protein shakes a day         [x]Consuming equal to 60-80 grams of protein a day   Mixing protein powder with 8 oz  ounces of decaf US coffee. [x] Food intake is appropriate  N/A - hasn't started yet, plans to start. [x] Reminded patient to keep food diary to bring to their 2 week follow up appointment. [x] Pain relief techniques utilized  [] Taking pain medication as prescribed  [] Utilizing Lidoderm patches (if prescribed)  []Taking Tylenol instead of prescription pain medication  [x] Wearing abdominal binder  [] Using ice for incisional pain  Pt reports that she has not needed any pain medication since leaving the hospital.      [x] Activity is appropriate  [x] Walking 10 minutes out of every hour  [x] Avoiding heavy lifting (>10lbs)   [x] Utilizing their incentive spirometer   [x]  is using 10 times per hour while awake    [] Issues with Nausea/Vomiting/Reflux  [] Using Zofran PRN for nausea/vomiting   []Taking Prilosec for reflux    No symptoms       [] Issues with Constipation         [x] Pt has had a BM since surgery              [x] Pt does not feel constipated  [] Tried Colace  [] Tried Miralax          [x] Multivitamin capsule not yet started - plans to start today. All questions and concerns addressed including:   -Pt reports some gas pain while and after drinking shake - had half shake last night, felt it moving from stomach up through her left shoulder, which woke her up from her sleep, then passed gas and symptoms went away.   Encouraged taking small sips, slowing down, alternating with clear liquids, having shakes earlier in the day,

## 2023-08-22 ENCOUNTER — CARE COORDINATION (OUTPATIENT)
Dept: CASE MANAGEMENT | Age: 38
End: 2023-08-22

## 2023-08-22 NOTE — CARE COORDINATION
[address] [address] [facility] [address] Care Transitions Follow Up Call    Patient Current Location:    Home: [place] [address]    Care Transition Nurse contacted the patient by telephone to perform post hospital discharge assessment. Verified name with patient as identifier. Provided introduction to self, and explanation of the Care Transition Nurse role. Patient: Charmayne Riches Patient :  1985  MRN: [id number]  Reason for Admission:   gastrectomy  Discharge Date:    RARS: 7    Challenges to be reviewed by the provider   Additional needs identified to be addressed with provider:    no           Method of communication with provider : none      SUMMARY  CTN spoke to the Pt who reports the following:    -Third day since has had BM. Denies abdominal bloating, abdominal pain, nausea, and vomiting. CTN suggested Miralax or SS and pt states she will f/u with surgeon for recommendations. -C/O cough, congestion, and fatigue have all resolved. -Denies fever, chills, nausea, vomiting, SOB / DB, wheezing, chest pain, feeling lightheaded / dizziness, heart palpitations / flutters, and weakness.  -Denies issues with fluid intake, appetite, and urination. -HFU with Dr Anselmo Franklin   -Scot Fonseca with PCP     From CDC: Are you at higher risk for severe illness? Wash your hands often. Avoid close contact (6 feet, which is about two arm lengths) w/people who are sick. Put distance between yourself and other people if COVID-19 is spreading in your community. Clean and disinfect frequently touched surfaces. Avoid all cruise travel and non-essential air travel. Call your healthcare professional if you have concerns about COVID-19 and your underlying condition or if you are sick. Pt will take all meds as prescribed and schedule / keep doctor's appt. [facility] provided education on s/s that require medical attention and when to seek medical attention.   [facility] advised Pt of use urgent care or physician's 24 hr access line if assistance

## 2023-08-24 ENCOUNTER — CARE COORDINATION (OUTPATIENT)
Dept: CASE MANAGEMENT | Age: 38
End: 2023-08-24

## 2023-08-24 NOTE — CARE COORDINATION
[address] [facility] Transitions Initial Follow Up Call    Patient:  Bebeto Buchanan  Patient :  1985  MRN:  [id number]   Reason for Admission:  GASTRECTOMY / Covid 19 monitoring   Discharge Date:  23  RARS: 7      Transitions of Care Initial Call    Was this an external facility discharge? NO    Discharge Facility: [facility]      Challenges to be reviewed by the provider   Additional needs identified to be addressed with provider:    no    AMB CC Provider Discharge Needs: none            1ST CTC attempt to reach Pt regarding recent hospital discharge. CTC left voice recording with call back number requesting a call back. Follow up appointments:    Future Appointments   Date Time Provider [address]   2023  8:45 AM DO FUAD Besnon [facility]   10/19/2023 10:30 AM DO LARISSA Damon BSN, RN  Care Transition Coordinator  Contact Number:  [phone]

## 2023-08-30 ENCOUNTER — OFFICE VISIT (OUTPATIENT)
Dept: BARIATRICS/WEIGHT MGMT | Age: 38
End: 2023-08-30

## 2023-08-30 VITALS
SYSTOLIC BLOOD PRESSURE: 149 MMHG | HEART RATE: 84 BPM | WEIGHT: 270 LBS | BODY MASS INDEX: 39.99 KG/M2 | HEIGHT: 69 IN | DIASTOLIC BLOOD PRESSURE: 89 MMHG

## 2023-08-30 DIAGNOSIS — I10 ESSENTIAL HYPERTENSION: ICD-10-CM

## 2023-08-30 DIAGNOSIS — E88.81 METABOLIC SYNDROME: Primary | ICD-10-CM

## 2023-08-30 DIAGNOSIS — K21.9 CHRONIC GERD: ICD-10-CM

## 2023-08-30 DIAGNOSIS — E66.9 DIABETES MELLITUS TYPE 2 IN OBESE (HCC): ICD-10-CM

## 2023-08-30 DIAGNOSIS — E66.01 MORBID OBESITY WITH BMI OF 40.0-44.9, ADULT (HCC): ICD-10-CM

## 2023-08-30 DIAGNOSIS — E11.69 DIABETES MELLITUS TYPE 2 IN OBESE (HCC): ICD-10-CM

## 2023-08-30 PROCEDURE — 99024 POSTOP FOLLOW-UP VISIT: CPT | Performed by: SURGERY

## 2023-08-30 NOTE — PROGRESS NOTES
Dietary Assessment Note      Vitals:   Vitals:    23 0854   BP: (!) 149/89   Pulse: 84   Weight: 270 lb (122.5 kg)   Height: 5' 9\" (1.753 m)    Patient lost 22 lbs since preop. Total Weight Loss: 22 lbs    Labs reviewed: labs are reviewed    Protein intake: 60-80 grams/day   Pt is having 2-3 shakes per day the last few days have increased to 3 shakes per day due to hunger. Fluid intake: 48-64 oz/day    Multivitamin/mineral intake: yes MVI fusion capsule with iron    Calcium intake: no    Other: no    Exercise: yes walking outside     Nutrition Assessment: 2 week post-op visit. She is eating pureed foods 2 times per day and 3 shakes per day made with 8oz decaf coffee    Amount able to eat per sittin oz     Following  rule: yes    Food Intolerances/issues: none    Client Concerns: heavy bleeding has stopped    Goals: advance to phase 3 diet at 3 weeks post op Tues   Limit protein shakes to 2 per day  Increase frequency of pureed foods (1-2 oz per sitting) up to 4-5 times per day.   Plan: follow up at 6 week post op    Theresa Hughes, MS, RD, LD

## 2023-08-31 ENCOUNTER — CARE COORDINATION (OUTPATIENT)
Dept: CASE MANAGEMENT | Age: 38
End: 2023-08-31

## 2023-08-31 NOTE — CARE COORDINATION
[facility] Transitions Initial Follow Up Call    Call within 2 business days of discharge: No    Patient:  Aubrie Mcnally  Patient :  1985  MRN:  [id number]   Reason for Admission:  GASTRECTOMY  Discharge Date:  23  RARS: 7      Transitions of Care Initial Call    Was this an external facility discharge? NO    Discharge Facility: [facility].      Challenges to be reviewed by the provider   Additional needs identified to be addressed with provider:    no    AMB CC Provider Discharge Needs: none          2ND CTC attempt to reach Pt regarding recent hospital discharge. CTC left voice recording with call back number requesting a call back. CT episode closed. HFU with surgeon completed   Declined HFU with PCP - future appt 10/19    Follow up appointments:    Future Appointments   Date Time Provider [address]   2023 11:30 AM DO FUAD Moore [facility]   10/19/2023 10:30 AM DO LARISSA Rangel BSN, RN  Care Transition Coordinator  Contact Number:  [phone]

## 2023-09-08 ENCOUNTER — TELEPHONE (OUTPATIENT)
Dept: PRIMARY CARE CLINIC | Age: 38
End: 2023-09-08

## 2023-09-08 NOTE — TELEPHONE ENCOUNTER
glucose monitoring (FREESTYLE FREEDOM) kit [[id number]]     Order Details  Dose: 1 kit Route: Does not apply Frequency: DAILY   Dispense Quantity: 1 kit Refills: 0          Si kit by Does not apply route daily         Start Date: 23 End Date: --   Written Date: 23 Expiration Date: 24       Diagnosis Association: Type 2 diabetes mellitus without complication, without long-term current use of insulin ([address]) (E11.9)   Providers    Authorizing Provider: Tyron Daniels DO NPI: [id number]   Ordering User:  Tyron Daniels DO          Pharmacy    [address] [place] [address]. - [place] [phone] -  [phone]   [facility] [address]   Phone:  [phone]  Fax:  [phone]

## 2023-09-24 DIAGNOSIS — I10 ESSENTIAL HYPERTENSION: ICD-10-CM

## 2023-09-25 RX ORDER — LOSARTAN POTASSIUM 50 MG/1
50 TABLET ORAL DAILY
Qty: 90 TABLET | Refills: 3 | Status: SHIPPED | OUTPATIENT
Start: 2023-09-25

## 2023-09-25 NOTE — TELEPHONE ENCOUNTER
Medication:   Requested Prescriptions     Pending Prescriptions Disp Refills    losartan (COZAAR) 50 MG tablet 90 tablet 3     Sig: Take 1 tablet by mouth daily        Last Filled:  6/27/2023    Patient Phone Number: [phone] (home)     Last appt: 7/19/2023   Next appt: 10/19/2023  Return in about 3 months (around 10/19/2023) for weight check.

## 2023-09-27 ENCOUNTER — OFFICE VISIT (OUTPATIENT)
Dept: BARIATRICS/WEIGHT MGMT | Age: 38
End: 2023-09-27

## 2023-09-27 VITALS — WEIGHT: 260 LBS | BODY MASS INDEX: 38.51 KG/M2 | HEIGHT: 69 IN

## 2023-09-27 DIAGNOSIS — I10 ESSENTIAL HYPERTENSION: ICD-10-CM

## 2023-09-27 DIAGNOSIS — E66.01 SEVERE OBESITY (BMI 35.0-39.9) WITH COMORBIDITY (HCC): ICD-10-CM

## 2023-09-27 DIAGNOSIS — E11.9 TYPE 2 DIABETES MELLITUS WITHOUT COMPLICATION, WITHOUT LONG-TERM CURRENT USE OF INSULIN (HCC): ICD-10-CM

## 2023-09-27 DIAGNOSIS — K21.9 CHRONIC GERD: ICD-10-CM

## 2023-09-27 DIAGNOSIS — E28.2 PCOS (POLYCYSTIC OVARIAN SYNDROME): ICD-10-CM

## 2023-09-27 DIAGNOSIS — E88.810 METABOLIC SYNDROME: Primary | ICD-10-CM

## 2023-09-27 PROCEDURE — 99024 POSTOP FOLLOW-UP VISIT: CPT | Performed by: SURGERY

## 2023-10-06 ENCOUNTER — TELEPHONE (OUTPATIENT)
Dept: BARIATRICS/WEIGHT MGMT | Age: 38
End: 2023-10-06

## 2023-10-06 NOTE — TELEPHONE ENCOUNTER
Returned pt call. Reports constipation/ hard to pass BM every 2-3 days, looking to increase fiber with supplement. Pt states she is drinking 46 oz fluids and incorporation veggies into meals. Reviewed Vitafusion fiber gummy 10 kcal, 5 grams fiber, <1 sugar alcohol for 2 pieces. Okay to use, also encouraged tracking 20-25 grams fiber intake/day. Also suggested colace 1-2X/day to help with bowel regularity while still adjusting to long-term food tolerance and portions. Pt verbalized understanding and has no further questions at this time.

## 2023-10-19 ENCOUNTER — OFFICE VISIT (OUTPATIENT)
Dept: PRIMARY CARE CLINIC | Age: 38
End: 2023-10-19
Payer: MEDICAID

## 2023-10-19 VITALS
SYSTOLIC BLOOD PRESSURE: 142 MMHG | HEIGHT: 69 IN | TEMPERATURE: 97.3 F | DIASTOLIC BLOOD PRESSURE: 82 MMHG | HEART RATE: 74 BPM | WEIGHT: 257.4 LBS | BODY MASS INDEX: 38.12 KG/M2

## 2023-10-19 DIAGNOSIS — Z98.890 S/P GASTRIC SURGERY: ICD-10-CM

## 2023-10-19 DIAGNOSIS — E11.9 TYPE 2 DIABETES MELLITUS WITHOUT COMPLICATION, WITHOUT LONG-TERM CURRENT USE OF INSULIN (HCC): Primary | ICD-10-CM

## 2023-10-19 DIAGNOSIS — E11.59 HYPERTENSION ASSOCIATED WITH DIABETES (HCC): ICD-10-CM

## 2023-10-19 DIAGNOSIS — I15.2 HYPERTENSION ASSOCIATED WITH DIABETES (HCC): ICD-10-CM

## 2023-10-19 PROBLEM — E88.810 METABOLIC SYNDROME: Status: RESOLVED | Noted: 2023-08-15 | Resolved: 2023-10-19

## 2023-10-19 LAB — HBA1C MFR BLD: 5.2 %

## 2023-10-19 PROCEDURE — G8427 DOCREV CUR MEDS BY ELIG CLIN: HCPCS | Performed by: STUDENT IN AN ORGANIZED HEALTH CARE EDUCATION/TRAINING PROGRAM

## 2023-10-19 PROCEDURE — 3077F SYST BP >= 140 MM HG: CPT | Performed by: STUDENT IN AN ORGANIZED HEALTH CARE EDUCATION/TRAINING PROGRAM

## 2023-10-19 PROCEDURE — G8484 FLU IMMUNIZE NO ADMIN: HCPCS | Performed by: STUDENT IN AN ORGANIZED HEALTH CARE EDUCATION/TRAINING PROGRAM

## 2023-10-19 PROCEDURE — G8417 CALC BMI ABV UP PARAM F/U: HCPCS | Performed by: STUDENT IN AN ORGANIZED HEALTH CARE EDUCATION/TRAINING PROGRAM

## 2023-10-19 PROCEDURE — 83036 HEMOGLOBIN GLYCOSYLATED A1C: CPT | Performed by: STUDENT IN AN ORGANIZED HEALTH CARE EDUCATION/TRAINING PROGRAM

## 2023-10-19 PROCEDURE — 3079F DIAST BP 80-89 MM HG: CPT | Performed by: STUDENT IN AN ORGANIZED HEALTH CARE EDUCATION/TRAINING PROGRAM

## 2023-10-19 PROCEDURE — 2022F DILAT RTA XM EVC RTNOPTHY: CPT | Performed by: STUDENT IN AN ORGANIZED HEALTH CARE EDUCATION/TRAINING PROGRAM

## 2023-10-19 PROCEDURE — 99214 OFFICE O/P EST MOD 30 MIN: CPT | Performed by: STUDENT IN AN ORGANIZED HEALTH CARE EDUCATION/TRAINING PROGRAM

## 2023-10-19 PROCEDURE — 3044F HG A1C LEVEL LT 7.0%: CPT | Performed by: STUDENT IN AN ORGANIZED HEALTH CARE EDUCATION/TRAINING PROGRAM

## 2023-10-19 PROCEDURE — 1036F TOBACCO NON-USER: CPT | Performed by: STUDENT IN AN ORGANIZED HEALTH CARE EDUCATION/TRAINING PROGRAM

## 2023-10-19 ASSESSMENT — ENCOUNTER SYMPTOMS
SHORTNESS OF BREATH: 0
NAUSEA: 0
WHEEZING: 0

## 2023-10-19 NOTE — PROGRESS NOTES
[facility]  10/19/2023    Farida Retana (:  1985) is a 43 y.o. female, here for evaluation of the following medical concerns:    Chief Complaint   Patient presents with    Diabetes    weight check        ASSESSMENT/ PLAN  1. Type 2 diabetes mellitus without complication, without long-term current use of insulin (HCC)  Controlled, A1c 5.2. Okay to remain off of antihyperglycemic medications. - POCT glycosylated hemoglobin (Hb A1C)    2. Hypertension associated with diabetes ([address])  Uncontrolled, patient is going to check what she is currently taking at home and let us know. She will need an increase at least until she loses more weight. Monitor blood pressure at home for goal of less than 140/90.    3. S/P gastric surgery  Continue follow-up with bariatric surgery as scheduled       Return in about 3 months (around 2024) for blood pressure follow-up. HPI  Patient presents today for follow-up on diabetes, hypertension. She recently underwent gastric sleeve surgery with Dr. Walter Higuera. Notes that she has just been cleared to exercise, and has started walking for 30 minutes a day. She is no longer taking metformin. Adherent to diet as recommended by bariatric surgery. She is unsure which blood pressure medicines she is currently taking. She does not check her blood pressure at home. ROS  Review of Systems   Constitutional:  Negative for activity change and unexpected weight change. HENT:  Negative for tinnitus. Eyes:  Negative for visual disturbance. Respiratory:  Negative for shortness of breath and wheezing. Cardiovascular:  Negative for chest pain, palpitations and leg swelling. Gastrointestinal:  Negative for nausea. Genitourinary:  Negative for decreased urine volume. Neurological:  Negative for syncope, light-headedness and headaches.        HISTORIES  Current Outpatient Medications on File Prior to Visit   Medication Sig Dispense Refill    losartan (COZAAR) 50

## 2023-10-23 DIAGNOSIS — E11.59 HYPERTENSION ASSOCIATED WITH DIABETES (HCC): Primary | ICD-10-CM

## 2023-10-23 DIAGNOSIS — I15.2 HYPERTENSION ASSOCIATED WITH DIABETES (HCC): Primary | ICD-10-CM

## 2023-10-23 RX ORDER — LOSARTAN POTASSIUM 100 MG/1
100 TABLET ORAL DAILY
Qty: 90 TABLET | Refills: 1 | Status: SHIPPED | OUTPATIENT
Start: 2023-10-23

## 2023-10-24 ENCOUNTER — TELEPHONE (OUTPATIENT)
Dept: BARIATRICS/WEIGHT MGMT | Age: 38
End: 2023-10-24

## 2023-10-24 NOTE — TELEPHONE ENCOUNTER
Patient is having mouth surgery on Thursday and has concerns about her diet. Please advise. Thank you.  Edvin Pineda

## 2023-10-24 NOTE — TELEPHONE ENCOUNTER
Called pt to review diet concerns. Suggested protein drinks 1-2X/day and following phase 3/soft textures. Provided handouts via email on file. Pt verbalized understanding and has no further questions at this time.

## 2023-10-31 ENCOUNTER — TELEPHONE (OUTPATIENT)
Dept: BARIATRICS/WEIGHT MGMT | Age: 38
End: 2023-10-31

## 2023-10-31 NOTE — TELEPHONE ENCOUNTER
Patient has been experiencing lots of gas and has not had a bowel movement since last Thursday. Patient has took miralax but it has not helped. Please advise.

## 2023-11-01 DIAGNOSIS — B37.9 YEAST INFECTION: Primary | ICD-10-CM

## 2023-11-01 RX ORDER — FLUCONAZOLE 150 MG/1
150 TABLET ORAL
Qty: 2 TABLET | Refills: 0 | Status: SHIPPED | OUTPATIENT
Start: 2023-11-01 | End: 2023-11-07

## 2023-11-29 ENCOUNTER — OFFICE VISIT (OUTPATIENT)
Dept: BARIATRICS/WEIGHT MGMT | Age: 38
End: 2023-11-29
Payer: MEDICAID

## 2023-11-29 VITALS — WEIGHT: 242 LBS | HEIGHT: 69 IN | BODY MASS INDEX: 35.84 KG/M2

## 2023-11-29 DIAGNOSIS — E88.810 METABOLIC SYNDROME: Primary | ICD-10-CM

## 2023-11-29 DIAGNOSIS — I10 ESSENTIAL HYPERTENSION: ICD-10-CM

## 2023-11-29 DIAGNOSIS — K21.9 CHRONIC GERD: ICD-10-CM

## 2023-11-29 DIAGNOSIS — E11.9 TYPE 2 DIABETES MELLITUS WITHOUT COMPLICATION, WITHOUT LONG-TERM CURRENT USE OF INSULIN (HCC): ICD-10-CM

## 2023-11-29 DIAGNOSIS — E66.01 SEVERE OBESITY (BMI 35.0-39.9) WITH COMORBIDITY (HCC): ICD-10-CM

## 2023-11-29 PROCEDURE — 99214 OFFICE O/P EST MOD 30 MIN: CPT | Performed by: SURGERY

## 2023-11-29 PROCEDURE — G8427 DOCREV CUR MEDS BY ELIG CLIN: HCPCS | Performed by: SURGERY

## 2023-11-29 PROCEDURE — 3044F HG A1C LEVEL LT 7.0%: CPT | Performed by: SURGERY

## 2023-11-29 PROCEDURE — G8417 CALC BMI ABV UP PARAM F/U: HCPCS | Performed by: SURGERY

## 2023-11-29 PROCEDURE — G8484 FLU IMMUNIZE NO ADMIN: HCPCS | Performed by: SURGERY

## 2023-11-29 PROCEDURE — 2022F DILAT RTA XM EVC RTNOPTHY: CPT | Performed by: SURGERY

## 2023-11-29 PROCEDURE — 1036F TOBACCO NON-USER: CPT | Performed by: SURGERY

## 2023-11-29 NOTE — PROGRESS NOTES
Dietary Assessment Note  Vitals:   Vitals:    11/29/23 0858   Weight: 109.8 kg (242 lb)   Height: 1.753 m (5' 9\")   Patient lost 18 lbs over past ~2 months. Total Weight Loss: 50 lbs    Labs reviewed: no new labs    Protein intake: 60-80 grams/day     Fluid intake: >64 oz/day- water    Multivitamin/mineral intake: yes - 1 fusion capsule with iron    Calcium intake: yes - 2 barimelts d/t dental work    Other: none    Exercise: yes - walking 5-6x/wk for at least 3 miles    Nutrition Assessment: 14 week post-op visit. Pt is following the soft and mushy diet again d/t recent dental work. Pt is eating 4-5x/day which includes 2 protein shakes daily. Incorporates protein with most meals/snacks. Always having vegetables with dinner and usually 1 piece of fruit daily.     Amount able to eat per sitting: ~3-4oz, measures food    Following 30/30/30 rule: yes    Food Intolerances/issues: none    Client Concerns: just wanted to make sure calcium supplement was okay    Goals:   - Continue plan  - Aim for vegetables 2x/day    Plan: f/u as directed    Trung Villasenor, RD, LD

## 2023-11-29 NOTE — PATIENT INSTRUCTIONS
Patient received dietary handouts and education.     Goals:   - Continue plan  - Aim for vegetables 2x/day

## 2023-12-07 DIAGNOSIS — E11.9 TYPE 2 DIABETES MELLITUS WITHOUT COMPLICATION, WITHOUT LONG-TERM CURRENT USE OF INSULIN (HCC): Primary | ICD-10-CM

## 2023-12-07 NOTE — TELEPHONE ENCOUNTER
Medication:   Requested Prescriptions     Pending Prescriptions Disp Refills    metFORMIN (GLUCOPHAGE) 1000 MG tablet [Pharmacy Med Name: METFORMIN HCL 1,000 MG TABLET] 180 tablet 1     Sig: TAKE 1 TABLET BY MOUTH TWICE A DAY WITH MEALS       Last Filled:  Discontinued by: Maryanne Renae DO on 10/19/2023 11:08  Reason: LIST CLEANUP    Patient Phone Number: [phone] (home)     Last appt: 10/19/2023   Next appt: 1/18/2024    Last Labs DM:   Lab Results   Component Value Date/Time    LABA1C 5.2 10/19/2023 10:49 AM

## 2024-02-05 DIAGNOSIS — E11.9 TYPE 2 DIABETES MELLITUS WITHOUT COMPLICATION, WITHOUT LONG-TERM CURRENT USE OF INSULIN (HCC): Primary | ICD-10-CM

## 2024-02-15 DIAGNOSIS — E11.9 TYPE 2 DIABETES MELLITUS WITHOUT COMPLICATION, WITHOUT LONG-TERM CURRENT USE OF INSULIN (HCC): ICD-10-CM

## 2024-02-15 LAB
25(OH)D3 SERPL-MCNC: 71.7 NG/ML
ALBUMIN SERPL-MCNC: 3.9 G/DL (ref 3.4–5)
ALBUMIN/GLOB SERPL: 1.2 {RATIO} (ref 1.1–2.2)
ALP SERPL-CCNC: 77 U/L (ref 40–129)
ALT SERPL-CCNC: 8 U/L (ref 10–40)
ANION GAP SERPL CALCULATED.3IONS-SCNC: 9 MMOL/L (ref 3–16)
AST SERPL-CCNC: 12 U/L (ref 15–37)
BASOPHILS # BLD: 0.1 K/UL (ref 0–0.2)
BASOPHILS NFR BLD: 1.4 %
BILIRUB SERPL-MCNC: 0.5 MG/DL (ref 0–1)
BUN SERPL-MCNC: 11 MG/DL (ref 7–20)
CALCIUM SERPL-MCNC: 9.1 MG/DL (ref 8.3–10.6)
CHLORIDE SERPL-SCNC: 102 MMOL/L (ref 99–110)
CHOLEST SERPL-MCNC: 138 MG/DL (ref 0–199)
CO2 SERPL-SCNC: 28 MMOL/L (ref 21–32)
CREAT SERPL-MCNC: 0.8 MG/DL (ref 0.6–1.1)
DEPRECATED RDW RBC AUTO: 13 % (ref 12.4–15.4)
EOSINOPHIL # BLD: 0.1 K/UL (ref 0–0.6)
EOSINOPHIL NFR BLD: 2.3 %
GFR SERPLBLD CREATININE-BSD FMLA CKD-EPI: >60 ML/MIN/{1.73_M2}
GLUCOSE SERPL-MCNC: 101 MG/DL (ref 70–99)
HCT VFR BLD AUTO: 32.8 % (ref 36–48)
HDLC SERPL-MCNC: 64 MG/DL (ref 40–60)
HGB BLD-MCNC: 11.4 G/DL (ref 12–16)
IRON SATN MFR SERPL: 27 % (ref 15–50)
IRON SERPL-MCNC: 102 UG/DL (ref 37–145)
LDLC SERPL CALC-MCNC: 58 MG/DL
LYMPHOCYTES # BLD: 1.5 K/UL (ref 1–5.1)
LYMPHOCYTES NFR BLD: 37.3 %
MCH RBC QN AUTO: 30.5 PG (ref 26–34)
MCHC RBC AUTO-ENTMCNC: 34.6 G/DL (ref 31–36)
MCV RBC AUTO: 88.1 FL (ref 80–100)
MONOCYTES # BLD: 0.2 K/UL (ref 0–1.3)
MONOCYTES NFR BLD: 6 %
NEUTROPHILS # BLD: 2.1 K/UL (ref 1.7–7.7)
NEUTROPHILS NFR BLD: 53 %
PLATELET # BLD AUTO: 245 K/UL (ref 135–450)
PMV BLD AUTO: 10.2 FL (ref 5–10.5)
POTASSIUM SERPL-SCNC: 4.1 MMOL/L (ref 3.5–5.1)
PROT SERPL-MCNC: 7.1 G/DL (ref 6.4–8.2)
RBC # BLD AUTO: 3.73 M/UL (ref 4–5.2)
SODIUM SERPL-SCNC: 139 MMOL/L (ref 136–145)
TIBC SERPL-MCNC: 377 UG/DL (ref 260–445)
TRIGL SERPL-MCNC: 81 MG/DL (ref 0–150)
TSH SERPL DL<=0.005 MIU/L-ACNC: 1.33 UIU/ML (ref 0.27–4.2)
VIT B12 SERPL-MCNC: 668 PG/ML (ref 211–911)
VLDLC SERPL CALC-MCNC: 16 MG/DL
WBC # BLD AUTO: 3.9 K/UL (ref 4–11)

## 2024-02-16 LAB
EST. AVERAGE GLUCOSE BLD GHB EST-MCNC: 93.9 MG/DL
HBA1C MFR BLD: 4.9 %

## 2024-02-16 ASSESSMENT — PATIENT HEALTH QUESTIONNAIRE - PHQ9
SUM OF ALL RESPONSES TO PHQ QUESTIONS 1-9: 0
1. LITTLE INTEREST OR PLEASURE IN DOING THINGS: 0
SUM OF ALL RESPONSES TO PHQ QUESTIONS 1-9: 0
SUM OF ALL RESPONSES TO PHQ QUESTIONS 1-9: 0
2. FEELING DOWN, DEPRESSED OR HOPELESS: 0
1. LITTLE INTEREST OR PLEASURE IN DOING THINGS: NOT AT ALL
SUM OF ALL RESPONSES TO PHQ QUESTIONS 1-9: 0
2. FEELING DOWN, DEPRESSED OR HOPELESS: NOT AT ALL
SUM OF ALL RESPONSES TO PHQ9 QUESTIONS 1 & 2: 0
SUM OF ALL RESPONSES TO PHQ9 QUESTIONS 1 & 2: 0

## 2024-02-19 ENCOUNTER — OFFICE VISIT (OUTPATIENT)
Dept: PRIMARY CARE CLINIC | Age: 39
End: 2024-02-19
Payer: MEDICAID

## 2024-02-19 VITALS
TEMPERATURE: 97.2 F | WEIGHT: 242.8 LBS | BODY MASS INDEX: 35.96 KG/M2 | SYSTOLIC BLOOD PRESSURE: 137 MMHG | DIASTOLIC BLOOD PRESSURE: 86 MMHG | HEIGHT: 69 IN

## 2024-02-19 DIAGNOSIS — F41.1 GAD (GENERALIZED ANXIETY DISORDER): Primary | ICD-10-CM

## 2024-02-19 DIAGNOSIS — E11.59 HYPERTENSION ASSOCIATED WITH DIABETES (HCC): ICD-10-CM

## 2024-02-19 DIAGNOSIS — D56.3 THALASSEMIA TRAIT: ICD-10-CM

## 2024-02-19 DIAGNOSIS — I15.2 HYPERTENSION ASSOCIATED WITH DIABETES (HCC): ICD-10-CM

## 2024-02-19 PROCEDURE — 99214 OFFICE O/P EST MOD 30 MIN: CPT | Performed by: STUDENT IN AN ORGANIZED HEALTH CARE EDUCATION/TRAINING PROGRAM

## 2024-02-19 PROCEDURE — 1036F TOBACCO NON-USER: CPT | Performed by: STUDENT IN AN ORGANIZED HEALTH CARE EDUCATION/TRAINING PROGRAM

## 2024-02-19 PROCEDURE — 2022F DILAT RTA XM EVC RTNOPTHY: CPT | Performed by: STUDENT IN AN ORGANIZED HEALTH CARE EDUCATION/TRAINING PROGRAM

## 2024-02-19 PROCEDURE — 3044F HG A1C LEVEL LT 7.0%: CPT | Performed by: STUDENT IN AN ORGANIZED HEALTH CARE EDUCATION/TRAINING PROGRAM

## 2024-02-19 PROCEDURE — G8484 FLU IMMUNIZE NO ADMIN: HCPCS | Performed by: STUDENT IN AN ORGANIZED HEALTH CARE EDUCATION/TRAINING PROGRAM

## 2024-02-19 PROCEDURE — G8417 CALC BMI ABV UP PARAM F/U: HCPCS | Performed by: STUDENT IN AN ORGANIZED HEALTH CARE EDUCATION/TRAINING PROGRAM

## 2024-02-19 PROCEDURE — 3075F SYST BP GE 130 - 139MM HG: CPT | Performed by: STUDENT IN AN ORGANIZED HEALTH CARE EDUCATION/TRAINING PROGRAM

## 2024-02-19 PROCEDURE — G8427 DOCREV CUR MEDS BY ELIG CLIN: HCPCS | Performed by: STUDENT IN AN ORGANIZED HEALTH CARE EDUCATION/TRAINING PROGRAM

## 2024-02-19 PROCEDURE — 3079F DIAST BP 80-89 MM HG: CPT | Performed by: STUDENT IN AN ORGANIZED HEALTH CARE EDUCATION/TRAINING PROGRAM

## 2024-02-19 RX ORDER — FLUOXETINE 10 MG/1
10 CAPSULE ORAL DAILY
Qty: 30 CAPSULE | Refills: 1 | Status: SHIPPED | OUTPATIENT
Start: 2024-02-19

## 2024-02-19 ASSESSMENT — ENCOUNTER SYMPTOMS
NAUSEA: 0
SHORTNESS OF BREATH: 0
WHEEZING: 0

## 2024-02-19 NOTE — PROGRESS NOTES
[facility]  2024    Chiqui Cotto (:  1985) is a 38 y.o. female, here for evaluation of the following medical concerns:    Chief Complaint   Patient presents with    Blood Pressure Check    Discuss Labs        ASSESSMENT/ PLAN  1. GALDINO (generalized anxiety disorder)  Uncontrolled, this is a new problem.  Discussed risk, benefits and side effects of initiating SSRI therapy today.  Follow-up in 4 weeks for recheck.  Declines psychotherapy at this time.  - FLUoxetine (PROZAC) 10 MG capsule; Take 1 capsule by mouth daily  Dispense: 30 capsule; Refill: 1    2. Hypertension associated with diabetes (HCC)  Controlled, continue losartan and weight loss.    Return in about 4 weeks (around 3/18/2024) for mood recheck, VV.    HPI  Patient presents today for follow-up on hypertension with concerns of anxiety.    States she has had a history of anxiety, but has been worse recently.  She cannot pinpoint a specific event which caused this to happen.  She has been able to manage without medication up until now, but is interested in starting an SSRI at this time.  Notes that it is now interfering with her day-to-day life, and her  has also commented.  Denies depression symptoms.    Currently taking losartan daily as prescribed for her hypertension with good control.    ROS  Review of Systems   Constitutional:  Negative for activity change and unexpected weight change.   HENT:  Negative for tinnitus.    Eyes:  Negative for visual disturbance.   Respiratory:  Negative for shortness of breath and wheezing.    Cardiovascular:  Negative for chest pain, palpitations and leg swelling.   Gastrointestinal:  Negative for nausea.   Genitourinary:  Negative for decreased urine volume.   Neurological:  Negative for syncope, light-headedness and headaches.   Psychiatric/Behavioral:  The patient is nervous/anxious.        HISTORIES  Current Outpatient Medications on File Prior to Visit   Medication Sig Dispense

## 2024-02-22 DIAGNOSIS — E55.9 VITAMIN D DEFICIENCY: ICD-10-CM

## 2024-02-22 NOTE — TELEPHONE ENCOUNTER
Medication:   Requested Prescriptions     Pending Prescriptions Disp Refills    vitamin D (ERGOCALCIFEROL) 1.25 MG (54047 UT) CAPS capsule [Pharmacy Med Name: VITAMIN D2 1.25MG(50,000 UNIT)] 8 capsule      Sig: TAKE 1 CAPSULE BY MOUTH ONE TIME PER WEEK        Last Filled:  8/30/23    Patient Phone Number: [phone] (home)     Last appt: 2/19/2024   Next appt: 3/18/2024    Last OARRS:        No data to display

## 2024-02-23 RX ORDER — ERGOCALCIFEROL 1.25 MG/1
50000 CAPSULE ORAL WEEKLY
Qty: 12 CAPSULE | Refills: 1 | Status: SHIPPED | OUTPATIENT
Start: 2024-02-23

## 2024-03-07 DIAGNOSIS — F41.1 GAD (GENERALIZED ANXIETY DISORDER): ICD-10-CM

## 2024-03-07 NOTE — TELEPHONE ENCOUNTER
Medication:   Requested Prescriptions     Pending Prescriptions Disp Refills    FLUoxetine (PROZAC) 10 MG capsule 30 capsule 1     Sig: Take 1 capsule by mouth daily        Last Filled:  requested 90 day supply    Patient Phone Number: [phone] (home)     Last appt: 2/19/2024   Next appt: 3/18/2024    Last OARRS:        No data to display

## 2024-03-08 RX ORDER — FLUOXETINE 10 MG/1
10 CAPSULE ORAL DAILY
Qty: 90 CAPSULE | Refills: 0 | Status: SHIPPED | OUTPATIENT
Start: 2024-03-08

## 2024-04-21 DIAGNOSIS — E11.59 HYPERTENSION ASSOCIATED WITH DIABETES (HCC): ICD-10-CM

## 2024-04-21 DIAGNOSIS — I15.2 HYPERTENSION ASSOCIATED WITH DIABETES (HCC): ICD-10-CM

## 2024-04-22 DIAGNOSIS — E11.59 HYPERTENSION ASSOCIATED WITH DIABETES (HCC): ICD-10-CM

## 2024-04-22 DIAGNOSIS — I15.2 HYPERTENSION ASSOCIATED WITH DIABETES (HCC): ICD-10-CM

## 2024-04-22 RX ORDER — LOSARTAN POTASSIUM 100 MG/1
100 TABLET ORAL DAILY
Qty: 90 TABLET | Refills: 1 | Status: SHIPPED | OUTPATIENT
Start: 2024-04-22

## 2024-04-22 RX ORDER — LOSARTAN POTASSIUM 100 MG/1
100 TABLET ORAL DAILY
Qty: 90 TABLET | Refills: 1 | OUTPATIENT
Start: 2024-04-22

## 2024-04-22 NOTE — TELEPHONE ENCOUNTER
Medication:   Requested Prescriptions     Pending Prescriptions Disp Refills    losartan (COZAAR) 100 MG tablet 90 tablet 1     Sig: Take 1 tablet by mouth daily        Last Filled:  10/23/23    Patient Phone Number: [phone] (home)     Last appt: 2/19/2024   Next appt: 4/22/2024    Last OARRS:        No data to display

## 2024-04-22 NOTE — TELEPHONE ENCOUNTER
Medication:   Requested Prescriptions     Pending Prescriptions Disp Refills    losartan (COZAAR) 100 MG tablet [Pharmacy Med Name: LOSARTAN POTASSIUM 100 MG TAB] 90 tablet 1     Sig: TAKE 1 TABLET BY MOUTH EVERY DAY        Last Filled:  10/23/23    Patient Phone Number: [phone] (home)     Last appt: 2/19/2024 eturn in about 4 weeks (around 3/18/2024) for mood recheck, VV.   Next appt: Visit date not found    Last OARRS:        No data to display

## 2024-06-08 DIAGNOSIS — F41.1 GAD (GENERALIZED ANXIETY DISORDER): ICD-10-CM

## 2024-06-10 RX ORDER — FLUOXETINE 10 MG/1
CAPSULE ORAL DAILY
Qty: 90 CAPSULE | Refills: 0 | Status: SHIPPED | OUTPATIENT
Start: 2024-06-10

## 2024-06-10 NOTE — TELEPHONE ENCOUNTER
Medication:   Requested Prescriptions     Pending Prescriptions Disp Refills    FLUoxetine (PROZAC) 10 MG capsule [Pharmacy Med Name: FLUOXETINE HCL 10 MG CAPSULE] 90 capsule 0     Sig: TAKE 1 CAPSULE BY MOUTH EVERY DAY        Last Filled:  3/8/24    Last appt: 2/19/2024   Next appt: Visit date not found  Return in about 4 weeks (around 3/18/2024) for mood recheck, VV.  Last OARRS:        No data to display              
Medication:   Requested Prescriptions     Pending Prescriptions Disp Refills    FLUoxetine (PROZAC) 10 MG capsule [Pharmacy Med Name: FLUOXETINE HCL 10 MG CAPSULE] 90 capsule 0     Sig: TAKE 1 CAPSULE BY MOUTH EVERY DAY        Last Filled:  3/8/24    Last appt: 2/19/2024   Next appt: Visit date not found  Return in about 4 weeks (around 3/18/2024) for mood recheck, VV.  Last OARRS:        No data to display                
Pt states she never filled the medication.   Informed pt that she should just need to reach out to the pharmacy to fill.   
pulse oximetry

## 2024-09-10 DIAGNOSIS — E55.9 VITAMIN D DEFICIENCY: ICD-10-CM

## 2024-09-10 DIAGNOSIS — I15.2 HYPERTENSION ASSOCIATED WITH DIABETES (HCC): ICD-10-CM

## 2024-09-10 DIAGNOSIS — E11.59 HYPERTENSION ASSOCIATED WITH DIABETES (HCC): ICD-10-CM

## 2024-09-10 RX ORDER — ERGOCALCIFEROL 1.25 MG/1
50000 CAPSULE, LIQUID FILLED ORAL WEEKLY
Qty: 12 CAPSULE | Refills: 1 | Status: SHIPPED | OUTPATIENT
Start: 2024-09-10

## 2024-09-10 RX ORDER — LOSARTAN POTASSIUM 100 MG/1
100 TABLET ORAL DAILY
Qty: 90 TABLET | Refills: 1 | Status: SHIPPED | OUTPATIENT
Start: 2024-09-10

## 2025-02-12 ENCOUNTER — PATIENT MESSAGE (OUTPATIENT)
Dept: BARIATRICS/WEIGHT MGMT | Age: 40
End: 2025-02-12

## 2025-02-12 NOTE — TELEPHONE ENCOUNTER
Attempt to contact for Bariatric Follow Up. [employer]cler message sent and letter mailed to address on file in [facility]

## 2025-04-24 DIAGNOSIS — I15.2 HYPERTENSION ASSOCIATED WITH DIABETES (HCC): ICD-10-CM

## 2025-04-24 DIAGNOSIS — E11.59 HYPERTENSION ASSOCIATED WITH DIABETES (HCC): ICD-10-CM

## 2025-04-24 RX ORDER — LOSARTAN POTASSIUM 100 MG/1
100 TABLET ORAL DAILY
Qty: 30 TABLET | Refills: 0 | Status: SHIPPED | OUTPATIENT
Start: 2025-04-24 | End: 2025-05-19

## 2025-04-24 NOTE — TELEPHONE ENCOUNTER
Medication:   Requested Prescriptions     Pending Prescriptions Disp Refills    losartan (COZAAR) 100 MG tablet [Pharmacy Med Name: LOSARTAN POTASSIUM 100 MG TAB] 90 tablet 1     Sig: TAKE 1 TABLET BY MOUTH EVERY DAY        Last Filled:  9/10/24    Patient Phone Number: [phone] (home)     Last appt: 2/19/2024 Return in about 4 weeks (around 3/18/2024) for mood recheck, VV.  Next appt: Visit date not found    Last OARRS:        No data to display

## 2025-05-16 ASSESSMENT — PATIENT HEALTH QUESTIONNAIRE - PHQ9
SUM OF ALL RESPONSES TO PHQ QUESTIONS 1-9: 0
SUM OF ALL RESPONSES TO PHQ QUESTIONS 1-9: 0
1. LITTLE INTEREST OR PLEASURE IN DOING THINGS: NOT AT ALL
SUM OF ALL RESPONSES TO PHQ QUESTIONS 1-9: 0
2. FEELING DOWN, DEPRESSED OR HOPELESS: NOT AT ALL
SUM OF ALL RESPONSES TO PHQ QUESTIONS 1-9: 0
2. FEELING DOWN, DEPRESSED OR HOPELESS: NOT AT ALL
SUM OF ALL RESPONSES TO PHQ9 QUESTIONS 1 & 2: 0
1. LITTLE INTEREST OR PLEASURE IN DOING THINGS: NOT AT ALL

## 2025-05-18 DIAGNOSIS — E11.59 HYPERTENSION ASSOCIATED WITH DIABETES (HCC): ICD-10-CM

## 2025-05-18 DIAGNOSIS — I15.2 HYPERTENSION ASSOCIATED WITH DIABETES (HCC): ICD-10-CM

## 2025-05-19 ENCOUNTER — TELEPHONE (OUTPATIENT)
Dept: ADMINISTRATIVE | Age: 40
End: 2025-05-19

## 2025-05-19 ENCOUNTER — OFFICE VISIT (OUTPATIENT)
Dept: PRIMARY CARE CLINIC | Age: 40
End: 2025-05-19
Payer: MEDICAID

## 2025-05-19 VITALS
BODY MASS INDEX: 43.28 KG/M2 | HEIGHT: 69 IN | DIASTOLIC BLOOD PRESSURE: 88 MMHG | TEMPERATURE: 98.2 F | SYSTOLIC BLOOD PRESSURE: 141 MMHG | HEART RATE: 85 BPM | WEIGHT: 292.2 LBS

## 2025-05-19 DIAGNOSIS — E55.9 VITAMIN D DEFICIENCY: ICD-10-CM

## 2025-05-19 DIAGNOSIS — E11.9 TYPE 2 DIABETES MELLITUS WITHOUT COMPLICATION, WITHOUT LONG-TERM CURRENT USE OF INSULIN (HCC): ICD-10-CM

## 2025-05-19 DIAGNOSIS — E11.59 HYPERTENSION ASSOCIATED WITH DIABETES (HCC): ICD-10-CM

## 2025-05-19 DIAGNOSIS — R63.5 WEIGHT GAIN: ICD-10-CM

## 2025-05-19 DIAGNOSIS — D50.8 OTHER IRON DEFICIENCY ANEMIA: ICD-10-CM

## 2025-05-19 DIAGNOSIS — Z12.31 ENCOUNTER FOR SCREENING MAMMOGRAM FOR MALIGNANT NEOPLASM OF BREAST: ICD-10-CM

## 2025-05-19 DIAGNOSIS — N92.1 MENORRHAGIA WITH IRREGULAR CYCLE: ICD-10-CM

## 2025-05-19 DIAGNOSIS — Z00.00 ANNUAL PHYSICAL EXAM: Primary | ICD-10-CM

## 2025-05-19 DIAGNOSIS — I15.2 HYPERTENSION ASSOCIATED WITH DIABETES (HCC): ICD-10-CM

## 2025-05-19 LAB
25(OH)D3 SERPL-MCNC: 44.7 NG/ML
ALBUMIN SERPL-MCNC: 4.2 G/DL (ref 3.4–5)
ALBUMIN/GLOB SERPL: 1.3 {RATIO} (ref 1.1–2.2)
ALP SERPL-CCNC: 108 U/L (ref 40–129)
ALT SERPL-CCNC: 14 U/L (ref 10–40)
ANION GAP SERPL CALCULATED.3IONS-SCNC: 8 MMOL/L (ref 3–16)
AST SERPL-CCNC: 15 U/L (ref 15–37)
BASOPHILS # BLD: 0.1 K/UL (ref 0–0.2)
BASOPHILS NFR BLD: 1.1 %
BILIRUB SERPL-MCNC: 0.6 MG/DL (ref 0–1)
BUN SERPL-MCNC: 13 MG/DL (ref 7–20)
CALCIUM SERPL-MCNC: 9.3 MG/DL (ref 8.3–10.6)
CHLORIDE SERPL-SCNC: 104 MMOL/L (ref 99–110)
CHOLEST SERPL-MCNC: 155 MG/DL (ref 0–199)
CO2 SERPL-SCNC: 27 MMOL/L (ref 21–32)
CREAT SERPL-MCNC: 0.9 MG/DL (ref 0.6–1.1)
CREAT UR-MCNC: 181 MG/DL (ref 28–259)
DEPRECATED RDW RBC AUTO: 12.2 % (ref 12.4–15.4)
EOSINOPHIL # BLD: 0.2 K/UL (ref 0–0.6)
EOSINOPHIL NFR BLD: 3.4 %
ESTRADIOL SERPL-MCNC: 27 PG/ML
FERRITIN SERPL IA-MCNC: 104 NG/ML (ref 15–150)
FSH SERPL-ACNC: 8.3 MIU/ML
GFR SERPLBLD CREATININE-BSD FMLA CKD-EPI: 82 ML/MIN/{1.73_M2}
GLUCOSE SERPL-MCNC: 150 MG/DL (ref 70–99)
HBA1C MFR BLD: 6.2 %
HCT VFR BLD AUTO: 36.6 % (ref 36–48)
HDLC SERPL-MCNC: 52 MG/DL (ref 40–60)
HGB BLD-MCNC: 12.2 G/DL (ref 12–16)
IRON SATN MFR SERPL: 22 % (ref 15–50)
IRON SERPL-MCNC: 81 UG/DL (ref 37–145)
LDLC SERPL CALC-MCNC: 91 MG/DL
LYMPHOCYTES # BLD: 1.6 K/UL (ref 1–5.1)
LYMPHOCYTES NFR BLD: 30.1 %
MCH RBC QN AUTO: 29.7 PG (ref 26–34)
MCHC RBC AUTO-ENTMCNC: 33.4 G/DL (ref 31–36)
MCV RBC AUTO: 88.7 FL (ref 80–100)
MICROALBUMIN UR DL<=1MG/L-MCNC: <1.2 MG/DL
MICROALBUMIN/CREAT UR: NORMAL MG/G (ref 0–30)
MONOCYTES # BLD: 0.4 K/UL (ref 0–1.3)
MONOCYTES NFR BLD: 7.4 %
NEUTROPHILS # BLD: 3.1 K/UL (ref 1.7–7.7)
NEUTROPHILS NFR BLD: 58 %
PLATELET # BLD AUTO: 269 K/UL (ref 135–450)
PMV BLD AUTO: 10.7 FL (ref 5–10.5)
POTASSIUM SERPL-SCNC: 4.5 MMOL/L (ref 3.5–5.1)
PROT SERPL-MCNC: 7.5 G/DL (ref 6.4–8.2)
RBC # BLD AUTO: 4.12 M/UL (ref 4–5.2)
SODIUM SERPL-SCNC: 139 MMOL/L (ref 136–145)
TIBC SERPL-MCNC: 365 UG/DL (ref 260–445)
TRIGL SERPL-MCNC: 61 MG/DL (ref 0–150)
TSH SERPL DL<=0.005 MIU/L-ACNC: 1.44 UIU/ML (ref 0.27–4.2)
VLDLC SERPL CALC-MCNC: 12 MG/DL
WBC # BLD AUTO: 5.3 K/UL (ref 4–11)

## 2025-05-19 PROCEDURE — 99396 PREV VISIT EST AGE 40-64: CPT | Performed by: STUDENT IN AN ORGANIZED HEALTH CARE EDUCATION/TRAINING PROGRAM

## 2025-05-19 PROCEDURE — 3079F DIAST BP 80-89 MM HG: CPT | Performed by: STUDENT IN AN ORGANIZED HEALTH CARE EDUCATION/TRAINING PROGRAM

## 2025-05-19 PROCEDURE — G8417 CALC BMI ABV UP PARAM F/U: HCPCS | Performed by: STUDENT IN AN ORGANIZED HEALTH CARE EDUCATION/TRAINING PROGRAM

## 2025-05-19 PROCEDURE — 90471 IMMUNIZATION ADMIN: CPT | Performed by: STUDENT IN AN ORGANIZED HEALTH CARE EDUCATION/TRAINING PROGRAM

## 2025-05-19 PROCEDURE — 1036F TOBACCO NON-USER: CPT | Performed by: STUDENT IN AN ORGANIZED HEALTH CARE EDUCATION/TRAINING PROGRAM

## 2025-05-19 PROCEDURE — G8427 DOCREV CUR MEDS BY ELIG CLIN: HCPCS | Performed by: STUDENT IN AN ORGANIZED HEALTH CARE EDUCATION/TRAINING PROGRAM

## 2025-05-19 PROCEDURE — 2022F DILAT RTA XM EVC RTNOPTHY: CPT | Performed by: STUDENT IN AN ORGANIZED HEALTH CARE EDUCATION/TRAINING PROGRAM

## 2025-05-19 PROCEDURE — 90677 PCV20 VACCINE IM: CPT | Performed by: STUDENT IN AN ORGANIZED HEALTH CARE EDUCATION/TRAINING PROGRAM

## 2025-05-19 PROCEDURE — 3077F SYST BP >= 140 MM HG: CPT | Performed by: STUDENT IN AN ORGANIZED HEALTH CARE EDUCATION/TRAINING PROGRAM

## 2025-05-19 PROCEDURE — 99214 OFFICE O/P EST MOD 30 MIN: CPT | Performed by: STUDENT IN AN ORGANIZED HEALTH CARE EDUCATION/TRAINING PROGRAM

## 2025-05-19 PROCEDURE — 3044F HG A1C LEVEL LT 7.0%: CPT | Performed by: STUDENT IN AN ORGANIZED HEALTH CARE EDUCATION/TRAINING PROGRAM

## 2025-05-19 PROCEDURE — 83036 HEMOGLOBIN GLYCOSYLATED A1C: CPT | Performed by: STUDENT IN AN ORGANIZED HEALTH CARE EDUCATION/TRAINING PROGRAM

## 2025-05-19 RX ORDER — LOSARTAN POTASSIUM 100 MG/1
100 TABLET ORAL DAILY
Qty: 90 TABLET | Refills: 1 | Status: SHIPPED | OUTPATIENT
Start: 2025-05-19

## 2025-05-19 RX ORDER — HYDROCHLOROTHIAZIDE 25 MG/1
25 TABLET ORAL EVERY MORNING
Qty: 30 TABLET | Refills: 0 | Status: SHIPPED | OUTPATIENT
Start: 2025-05-19

## 2025-05-19 RX ORDER — ERGOCALCIFEROL 1.25 MG/1
50000 CAPSULE, LIQUID FILLED ORAL WEEKLY
Qty: 12 CAPSULE | Refills: 1 | Status: SHIPPED | OUTPATIENT
Start: 2025-05-19

## 2025-05-19 SDOH — ECONOMIC STABILITY: FOOD INSECURITY: WITHIN THE PAST 12 MONTHS, THE FOOD YOU BOUGHT JUST DIDN'T LAST AND YOU DIDN'T HAVE MONEY TO GET MORE.: NEVER TRUE

## 2025-05-19 SDOH — ECONOMIC STABILITY: FOOD INSECURITY: WITHIN THE PAST 12 MONTHS, YOU WORRIED THAT YOUR FOOD WOULD RUN OUT BEFORE YOU GOT MONEY TO BUY MORE.: NEVER TRUE

## 2025-05-19 ASSESSMENT — ENCOUNTER SYMPTOMS
WHEEZING: 0
SHORTNESS OF BREATH: 0
NAUSEA: 0

## 2025-05-19 ASSESSMENT — PATIENT HEALTH QUESTIONNAIRE - PHQ9
SUM OF ALL RESPONSES TO PHQ QUESTIONS 1-9: 0
SUM OF ALL RESPONSES TO PHQ QUESTIONS 1-9: 0
2. FEELING DOWN, DEPRESSED OR HOPELESS: NOT AT ALL
SUM OF ALL RESPONSES TO PHQ QUESTIONS 1-9: 0
1. LITTLE INTEREST OR PLEASURE IN DOING THINGS: NOT AT ALL
SUM OF ALL RESPONSES TO PHQ QUESTIONS 1-9: 0

## 2025-05-19 NOTE — PROGRESS NOTES
[facility]  2025    Chiqui Cotto (:  1985) is a 39 y.o. female, here for evaluation of the following medical concerns:    Chief Complaint   Patient presents with    Annual Exam    Diabetes    Hypertension        ASSESSMENT/ PLAN  1. Annual physical exam  Screening labs ordered today if indicated, recommend 150 minutes of exercise weekly and healthy dietary choices.  Pertinent cancer screening and immunizations recommended/discussed with the patient, and orders placed or deferred per patient consent.    2. Type 2 diabetes mellitus without complication, without long-term current use of insulin (HCC)  Controlled, initiate Mounjaro for concurrent weight loss and update labs  - POCT glycosylated hemoglobin (Hb A1C)  - Comprehensive Metabolic Panel; Future  - Lipid Panel; Future  - Albumin/Creatinine Ratio, Urine; Future  - Tirzepatide (MOUNJARO) 2.5 MG/0.5ML SOAJ pen; Inject 2.5 mg into the skin every 7 days  Dispense: 2 mL; Refill: 0    3. Encounter for screening mammogram for malignant neoplasm of breast  - BERTHA DIGITAL SCREEN W OR WO CAD BILATERAL; Future    4. Hypertension associated with diabetes (HCC)  Uncontrolled, continue losartan and add hydrochlorothiazide.  Recheck in 2 weeks  - hydroCHLOROthiazide (HYDRODIURIL) 25 MG tablet; Take 1 tablet by mouth every morning  Dispense: 30 tablet; Refill: 0    5. Weight gain  Uncontrolled, likely secondary to lack of diet and exercise plan.  Update labs per patient request  - TSH reflex to FT4; Future  - Follicle Stimulating Hormone; Future  - Estradiol; Future    6. Menorrhagia with irregular cycle  Referral placed to discuss surgical options for management  - Anuel Draper MD, Gynecology, [facility]    7. Other iron deficiency anemia  Continue iron infusions and update labs today  - CBC with Auto Differential; Future  - Ferritin; Future  - Iron and TIBC; Future    8. Vitamin D deficiency  - Vitamin D 25 Hydroxy; Future  -

## 2025-05-19 NOTE — TELEPHONE ENCOUNTER
Submitted PA for Mounjaro 2.5MG/0.5ML auto-injectors  Via CMM Key: [id number] STATUS: PENDING.    Follow up done daily; if no decision with in three days we will refax.  If another three days goes by with no decision will call the insurance for status.

## 2025-05-19 NOTE — TELEPHONE ENCOUNTER
Medication:   Requested Prescriptions     Pending Prescriptions Disp Refills    losartan (COZAAR) 100 MG tablet [Pharmacy Med Name: LOSARTAN POTASSIUM 100 MG TAB] 90 tablet 1     Sig: TAKE 1 TABLET BY MOUTH EVERY DAY        Last Filled:  4/24/25  90 day refill request    Patient Phone Number: [phone] (home)     Last appt: 2/19/2024   Next appt: 5/19/2025    Last OARRS:        No data to display

## 2025-05-20 ENCOUNTER — RESULTS FOLLOW-UP (OUTPATIENT)
Dept: PRIMARY CARE CLINIC | Age: 40
End: 2025-05-20

## 2025-05-20 DIAGNOSIS — E11.9 TYPE 2 DIABETES MELLITUS WITHOUT COMPLICATION, WITHOUT LONG-TERM CURRENT USE OF INSULIN (HCC): Primary | ICD-10-CM

## 2025-05-20 NOTE — TELEPHONE ENCOUNTER
DENIAL for Mounjaro 2.5MG/0.5ML auto-injectors; letter attached in this encounter and under the Media tab.    Coverage is provided when the member meets all the following: Member has had an inadequate clinical response (the inability to reach A1C goal after at least 120 days of current regimen, with use of two or more drugs concomitantly per ADA (American Diabetes Association) guidelines, documented adherence, and appropriate dose increases.      If this requires a response please respond to the pool ( P MHCX PSC MEDICATION PRE-AUTH).      Thank you please advise patient.

## 2025-05-28 ENCOUNTER — HOSPITAL ENCOUNTER (OUTPATIENT)
Dept: WOMENS IMAGING | Age: 40
Discharge: HOME | End: 2025-05-28
Payer: MEDICAID

## 2025-05-28 ENCOUNTER — OFFICE VISIT (OUTPATIENT)
Dept: PRIMARY CARE CLINIC | Age: 40
End: 2025-05-28
Payer: MEDICAID

## 2025-05-28 VITALS
SYSTOLIC BLOOD PRESSURE: 139 MMHG | DIASTOLIC BLOOD PRESSURE: 86 MMHG | WEIGHT: 292 LBS | OXYGEN SATURATION: 100 % | HEART RATE: 88 BPM | BODY MASS INDEX: 43.12 KG/M2

## 2025-05-28 VITALS — BODY MASS INDEX: 43.25 KG/M2 | WEIGHT: 292 LBS | HEIGHT: 69 IN

## 2025-05-28 DIAGNOSIS — E11.9 TYPE 2 DIABETES MELLITUS WITHOUT COMPLICATION, WITHOUT LONG-TERM CURRENT USE OF INSULIN (HCC): Primary | ICD-10-CM

## 2025-05-28 DIAGNOSIS — Z12.31 ENCOUNTER FOR SCREENING MAMMOGRAM FOR MALIGNANT NEOPLASM OF BREAST: ICD-10-CM

## 2025-05-28 DIAGNOSIS — E11.59 HYPERTENSION ASSOCIATED WITH DIABETES (HCC): ICD-10-CM

## 2025-05-28 DIAGNOSIS — Z23 NEED FOR VACCINATION: ICD-10-CM

## 2025-05-28 DIAGNOSIS — I15.2 HYPERTENSION ASSOCIATED WITH DIABETES (HCC): ICD-10-CM

## 2025-05-28 PROCEDURE — 90746 HEPB VACCINE 3 DOSE ADULT IM: CPT | Performed by: STUDENT IN AN ORGANIZED HEALTH CARE EDUCATION/TRAINING PROGRAM

## 2025-05-28 PROCEDURE — G8427 DOCREV CUR MEDS BY ELIG CLIN: HCPCS | Performed by: STUDENT IN AN ORGANIZED HEALTH CARE EDUCATION/TRAINING PROGRAM

## 2025-05-28 PROCEDURE — 3079F DIAST BP 80-89 MM HG: CPT | Performed by: STUDENT IN AN ORGANIZED HEALTH CARE EDUCATION/TRAINING PROGRAM

## 2025-05-28 PROCEDURE — 90472 IMMUNIZATION ADMIN EACH ADD: CPT | Performed by: STUDENT IN AN ORGANIZED HEALTH CARE EDUCATION/TRAINING PROGRAM

## 2025-05-28 PROCEDURE — 90715 TDAP VACCINE 7 YRS/> IM: CPT | Performed by: STUDENT IN AN ORGANIZED HEALTH CARE EDUCATION/TRAINING PROGRAM

## 2025-05-28 PROCEDURE — G8417 CALC BMI ABV UP PARAM F/U: HCPCS | Performed by: STUDENT IN AN ORGANIZED HEALTH CARE EDUCATION/TRAINING PROGRAM

## 2025-05-28 PROCEDURE — 90471 IMMUNIZATION ADMIN: CPT | Performed by: STUDENT IN AN ORGANIZED HEALTH CARE EDUCATION/TRAINING PROGRAM

## 2025-05-28 PROCEDURE — 3075F SYST BP GE 130 - 139MM HG: CPT | Performed by: STUDENT IN AN ORGANIZED HEALTH CARE EDUCATION/TRAINING PROGRAM

## 2025-05-28 PROCEDURE — 99214 OFFICE O/P EST MOD 30 MIN: CPT | Performed by: STUDENT IN AN ORGANIZED HEALTH CARE EDUCATION/TRAINING PROGRAM

## 2025-05-28 PROCEDURE — 1036F TOBACCO NON-USER: CPT | Performed by: STUDENT IN AN ORGANIZED HEALTH CARE EDUCATION/TRAINING PROGRAM

## 2025-05-28 PROCEDURE — 2022F DILAT RTA XM EVC RTNOPTHY: CPT | Performed by: STUDENT IN AN ORGANIZED HEALTH CARE EDUCATION/TRAINING PROGRAM

## 2025-05-28 PROCEDURE — 3044F HG A1C LEVEL LT 7.0%: CPT | Performed by: STUDENT IN AN ORGANIZED HEALTH CARE EDUCATION/TRAINING PROGRAM

## 2025-05-28 PROCEDURE — 77063 BREAST TOMOSYNTHESIS BI: CPT

## 2025-05-28 RX ORDER — LOSARTAN POTASSIUM AND HYDROCHLOROTHIAZIDE 25; 100 MG/1; MG/1
1 TABLET ORAL DAILY
Qty: 90 TABLET | Refills: 1 | Status: SHIPPED | OUTPATIENT
Start: 2025-05-28

## 2025-05-28 ASSESSMENT — ENCOUNTER SYMPTOMS
NAUSEA: 0
SHORTNESS OF BREATH: 0
WHEEZING: 0

## 2025-05-28 NOTE — PROGRESS NOTES
to Visit   Medication Sig Dispense Refill    vitamin D (ERGOCALCIFEROL) 1.25 MG (00332 UT) CAPS capsule Take 1 capsule by mouth Once a week at 5 PM 12 capsule 1    FLUoxetine (PROZAC) 10 MG capsule TAKE 1 CAPSULE BY MOUTH EVERY DAY 90 capsule 0    Multiple Vitamins-Minerals (BARIATRIC FUSION PO) Take by mouth      KIMBERLEE 0.25-35 MG-MCG per tablet        No current facility-administered medications on file prior to visit.      Past Medical History:   Diagnosis Date    Anxiety     Diabetes mellitus (HCC)     G6PD deficiency     Hypertension, essential     Iron deficiency anemia     Morbid obesity with BMI of 40.0-44.9, adult (HCC)     Polycystic ovary syndrome     Positive H. pylori test      Patient Active Problem List   Diagnosis    Essential hypertension    Type 2 diabetes mellitus without complication, without long-term current use of insulin (HCC)    Menorrhagia with irregular cycle    PCOS (polycystic ovarian syndrome)    Morbid obesity with BMI of 40.0-44.9, adult ([facility])    Hypertension associated with diabetes ([facility])    Vitamin D deficiency    Absolute anemia    G6PD deficiency    S/P gastric surgery    Thalassemia trait       PE  Vitals:    05/28/25 0755   BP: 139/86   Pulse: 88   SpO2: 100%   Weight: 132.5 kg (292 lb)     Estimated body mass index is 43.12 kg/m² as calculated from the following:    Height as of 5/19/25: 1.753 m (5' 9\").    Weight as of this encounter: 132.5 kg (292 lb).    Physical Exam  Vitals reviewed.   Constitutional:       General: She is not in acute distress.     Appearance: Normal appearance. She is obese.   HENT:      Head: Normocephalic and atraumatic.   Eyes:      Extraocular Movements: Extraocular movements intact.      Pupils: Pupils are equal, round, and reactive to light.   Cardiovascular:      Rate and Rhythm: Normal rate and regular rhythm.      Pulses: Normal pulses.      Heart sounds: Normal heart sounds. No murmur heard.     No gallop.   Pulmonary:      Effort: Pulmonary

## 2025-05-30 DIAGNOSIS — E11.9 TYPE 2 DIABETES MELLITUS WITHOUT COMPLICATION, WITHOUT LONG-TERM CURRENT USE OF INSULIN (HCC): Primary | ICD-10-CM

## 2025-07-03 ENCOUNTER — TELEPHONE (OUTPATIENT)
Dept: ADMINISTRATIVE | Age: 40
End: 2025-07-03

## (undated) DEVICE — GLOVE ORANGE PI 7   MSG9070

## (undated) DEVICE — SEAL

## (undated) DEVICE — DEVICE CLSR 10/12MM XL PRT SYS SUT PASS ST DISP CARTER

## (undated) DEVICE — SYRINGE MED 10ML TRNSLUC BRL PLUNG BLK MRK POLYPR CTRL

## (undated) DEVICE — SOLUTION ANTIFOG VIS SYS CLEARIFY LAPSCP

## (undated) DEVICE — BOWL MED L 32OZ PLAS W/ MOLD GRAD EZ OPN PEEL PCH

## (undated) DEVICE — ROBOTIC: Brand: MEDLINE INDUSTRIES, INC.

## (undated) DEVICE — LIQUIBAND RAPID ADHESIVE 36/CS 0.8ML: Brand: MEDLINE

## (undated) DEVICE — VISIGI 3D®  CALIBRATION SYSTEM  SIZE 36FR STD W/ BULB: Brand: BOEHRINGER® VISIGI 3D™ SLEEVE GASTRECTOMY CALIBRATION SYSTEM, SIZE 36FR W/BULB

## (undated) DEVICE — BINDER ABD H12IN FOR 62-74IN WAIST UNIV 4 PNL PREM DSGN E

## (undated) DEVICE — CANNULA SEAL

## (undated) DEVICE — BLADELESS OBTURATOR, LONG: Brand: WECK VISTA

## (undated) DEVICE — SYSTEM SMK EVAC LAP TBNG FILTER HSNG BENT STYL PNK SEE CLR

## (undated) DEVICE — SYRINGE MED 10ML SLIP TIP BLNT FILL AND LUERLOCK DISP

## (undated) DEVICE — VESSEL SEALER EXTEND: Brand: ENDOWRIST

## (undated) DEVICE — TROCAR: Brand: KII FIOS FIRST ENTRY

## (undated) DEVICE — SUTURE CTD ANTBCTRL 54 IN TI VCRL PLU VLT

## (undated) DEVICE — INSUFFLATION NEEDLE TO ESTABLISH PNEUMOPERITONEUM.: Brand: INSUFFLATION NEEDLE

## (undated) DEVICE — LARGE NEEDLE DRIVER: Brand: ENDOWRIST

## (undated) DEVICE — STAPLER 60 RELOAD GREEN: Brand: SUREFORM

## (undated) DEVICE — SUTURE VCRL SZ 4-0 L18IN ABSRB UD L19MM PS-2 3/8 CIR PRIM J496H

## (undated) DEVICE — STAPLER 60 RELOAD BLUE: Brand: SUREFORM

## (undated) DEVICE — CADIERE FORCEPS: Brand: ENDOWRIST

## (undated) DEVICE — AGENT HEMSTAT W2XL4IN OXIDIZED REGENERATED CELOS ABSRB

## (undated) DEVICE — PUMP SUC IRR TBNG L10FT W/ HNDPC ASSEMB STRYKEFLOW 2

## (undated) DEVICE — ARM DRAPE

## (undated) DEVICE — 40583 XL ADVANCED TRENDELENBURG POSITIONING KIT: Brand: 40583 XL ADVANCED TRENDELENBURG POSITIONING KIT

## (undated) DEVICE — 60 ML SYRINGE,CATHETER TIP: Brand: MONOJECT

## (undated) DEVICE — GLOVE SURG SZ 75 L12IN THK75MIL DK GRN LTX FREE

## (undated) DEVICE — SUTURE ABSORBABLE MONOFILAMENT 2-0 SH 6 IN STRATAFIX SPRL SXPP1B415

## (undated) DEVICE — TOWEL,STOP FLAG GOLD N-W: Brand: MEDLINE

## (undated) DEVICE — GARMENT,MEDLINE,DVT,INT,CALF,LG, GEN2: Brand: MEDLINE

## (undated) DEVICE — PROGRASP FORCEPS: Brand: ENDOWRIST

## (undated) DEVICE — LAPAROSCOPIC SCISSORS: Brand: EPIX LAPAROSCOPIC SCISSORS

## (undated) DEVICE — STAPLER 60: Brand: SUREFORM